# Patient Record
Sex: FEMALE | Race: WHITE | NOT HISPANIC OR LATINO | URBAN - METROPOLITAN AREA
[De-identification: names, ages, dates, MRNs, and addresses within clinical notes are randomized per-mention and may not be internally consistent; named-entity substitution may affect disease eponyms.]

---

## 2020-05-26 ENCOUNTER — INPATIENT (INPATIENT)
Facility: HOSPITAL | Age: 50
LOS: 2 days | Discharge: HOME | DRG: 247 | End: 2020-05-29
Attending: HOSPITALIST | Admitting: HOSPITALIST
Payer: COMMERCIAL

## 2020-05-26 VITALS
WEIGHT: 171.96 LBS | SYSTOLIC BLOOD PRESSURE: 171 MMHG | HEART RATE: 93 BPM | TEMPERATURE: 98 F | RESPIRATION RATE: 18 BRPM | DIASTOLIC BLOOD PRESSURE: 79 MMHG | HEIGHT: 65 IN | OXYGEN SATURATION: 98 %

## 2020-05-26 LAB
SARS-COV-2 RNA SPEC QL NAA+PROBE: SIGNIFICANT CHANGE UP
TROPONIN T SERPL-MCNC: 0.07 NG/ML — CRITICAL HIGH (ref 0–0.01)

## 2020-05-26 PROCEDURE — 93010 ELECTROCARDIOGRAM REPORT: CPT

## 2020-05-26 PROCEDURE — 99285 EMERGENCY DEPT VISIT HI MDM: CPT

## 2020-05-26 PROCEDURE — 71045 X-RAY EXAM CHEST 1 VIEW: CPT | Mod: 26

## 2020-05-26 RX ORDER — HEPARIN SODIUM 5000 [USP'U]/ML
INJECTION INTRAVENOUS; SUBCUTANEOUS
Qty: 25000 | Refills: 0 | Status: DISCONTINUED | OUTPATIENT
Start: 2020-05-26 | End: 2020-05-27

## 2020-05-26 RX ORDER — ATORVASTATIN CALCIUM 80 MG/1
80 TABLET, FILM COATED ORAL ONCE
Refills: 0 | Status: COMPLETED | OUTPATIENT
Start: 2020-05-27 | End: 2020-05-27

## 2020-05-26 RX ORDER — ASPIRIN/CALCIUM CARB/MAGNESIUM 324 MG
325 TABLET ORAL ONCE
Refills: 0 | Status: COMPLETED | OUTPATIENT
Start: 2020-05-26 | End: 2020-05-26

## 2020-05-26 RX ORDER — CLOPIDOGREL BISULFATE 75 MG/1
600 TABLET, FILM COATED ORAL ONCE
Refills: 0 | Status: COMPLETED | OUTPATIENT
Start: 2020-05-27 | End: 2020-05-27

## 2020-05-26 RX ORDER — HEPARIN SODIUM 5000 [USP'U]/ML
4700 INJECTION INTRAVENOUS; SUBCUTANEOUS EVERY 6 HOURS
Refills: 0 | Status: DISCONTINUED | OUTPATIENT
Start: 2020-05-26 | End: 2020-05-27

## 2020-05-26 RX ORDER — SODIUM CHLORIDE 9 MG/ML
500 INJECTION INTRAMUSCULAR; INTRAVENOUS; SUBCUTANEOUS
Refills: 0 | Status: DISCONTINUED | OUTPATIENT
Start: 2020-05-27 | End: 2020-05-27

## 2020-05-26 RX ADMIN — HEPARIN SODIUM 950 UNIT(S)/HR: 5000 INJECTION INTRAVENOUS; SUBCUTANEOUS at 20:18

## 2020-05-26 RX ADMIN — Medication 325 MILLIGRAM(S): at 20:18

## 2020-05-26 NOTE — ED ADULT NURSE NOTE - CHPI ED NUR SYMPTOMS NEG
no back pain/no congestion/no chills/no syncope/no nausea/no vomiting/no dizziness/no diaphoresis/no fever

## 2020-05-26 NOTE — ED PROVIDER NOTE - ATTENDING CONTRIBUTION TO CARE
49F essential thrombocytosis, +fhx triple bypass x2 (mother at age 47yo), c/o nonpositional nonpleuritic dull ss chest pain radiating to L jaw/BUE that woke pt from sleep ~3a today. +similar exertional cp/DAVIES improved w/ rest x1-2mo. no prior cardiac evaluation. no fever/chills, no uri/cough, no abd pain/n/v, no diarrhea, no hematochezia/melena, no dysuria, no pedal edema/pain/rash, no phx dvt/pe, no etoh/ivdu, no antiplatelet/AC. avss. nontoxic. NAD. no active cp. no acute resp distress. found to have elevated trop 0.07. ckmb wnl. ekg w/o acute abnl. no e/o sepsis vs sig anemia vs electrolyte abnl. ua neg. hcg neg. cxr w/o acute focal consol vs ptx vs pulm edema vs widened mediastinum. covid neg. cardiology consulted. s/p heparin gtt only per cards. will admit for nstemi.    I saw and discussed the care of the pt directly with the ACP while the pt was in the ED. i have reviewed the ACP note and agree w/ the history, exam and plan of care other than as noted above.

## 2020-05-26 NOTE — ED PROVIDER NOTE - PHYSICAL EXAMINATION
CONSTITUTIONAL: WD,WN. NAD.    SKIN: Normal color and turgor. No rash.    HEAD: NC/AT.  EYES: Conjunctiva clear. EOMI. PERRL.    ENT: Airway patent, OP without erythema, tonsillar swelling or exudate; uvula midline without swelling. Nasal mucosa clear, no rhinorrhea.   RESPIRATORY:  Breathing non-labored. No retractions or accessory muscle use.  Lungs CTA bilat.  CARDIOVASCULAR:  RRR, S1S2. No M/R/G.      GI:  Abdomen soft, nontender.    MSK: Neck supple with painless ROM.  No lower extremity edema or calf tenderness.  No joint swelling or ROM limitation.  NEURO: Alert and oriented; CN II-XII grossly intact. Speech clear. 5/5 strength in all extremities.  Good balance. Steady gait.

## 2020-05-26 NOTE — ED ADULT TRIAGE NOTE - CHIEF COMPLAINT QUOTE
50 y/o female came in to ED from urgent care for evaluation of chest pain since yesterday, pt reports pain radiating to left jaw, denies medical hx.

## 2020-05-26 NOTE — ED PROVIDER NOTE - CLINICAL SUMMARY MEDICAL DECISION MAKING FREE TEXT BOX
48 yo fem with chest pain concerning for angina.  Hx of essential thrombocytosis and borderline HLD.  Strong family hx with mother requiring CABG at age 46.  No COVID-19 symptoms or known exposure.  No SOB and PERC negative.  Troponin elevated.  Plan: aspirin, admit to cardiology

## 2020-05-26 NOTE — ED PROVIDER NOTE - PROGRESS NOTE DETAILS
d/w cardiology fellow, recommends heparin drip (no bolus), admit to cardiology. d/w microbiology lab: COVID-19 prelim neg, but being sent to core lab for confirmation and may not result for 24 hours.  will send another sample for expedited testing.

## 2020-05-26 NOTE — ED PROVIDER NOTE - OBJECTIVE STATEMENT
48 y/o F pt with PMHx of essential thrombocytosis and borderline HLD, and no significant PSHx presents to ED c/o chest pain. Pain initially woke her up 3AM the night before last, and since then she's had chest pain whenever she exerts herself, i.e. walking 1 block. Pain is dull, radiates up to jaw and down to elbow in bilateral arms, resolves within a few moments of resting, but pt has consistent dull pressure in her chest for a while after resting. Took Mylanta at onset of pain without relief. Of note, noticed mild exertional chest discomfort when walking up a hill x 2 weeks. Significant FHx in mother who had 2 triple bypasses, first bypass at age 46.     Not a smoker, no cocaine or other drug use, no PMHx of HTN or cardiac diseases.  Not currently on any meds and no allergies.

## 2020-05-26 NOTE — ED ADULT NURSE NOTE - OBJECTIVE STATEMENT
Pt is a 49y female, presented to ED w/ c/o CP x yesterday. Pt states worse on exertion and or lying down. PT states SOB when walking. Pt went to  and was sent for further evaluation. Denies any fever/chills/n/v/d.

## 2020-05-27 DIAGNOSIS — I24.9 ACUTE ISCHEMIC HEART DISEASE, UNSPECIFIED: ICD-10-CM

## 2020-05-27 DIAGNOSIS — E78.5 HYPERLIPIDEMIA, UNSPECIFIED: ICD-10-CM

## 2020-05-27 DIAGNOSIS — D47.3 ESSENTIAL (HEMORRHAGIC) THROMBOCYTHEMIA: ICD-10-CM

## 2020-05-27 DIAGNOSIS — I82.90 ACUTE EMBOLISM AND THROMBOSIS OF UNSPECIFIED VEIN: ICD-10-CM

## 2020-05-27 LAB
A1C WITH ESTIMATED AVERAGE GLUCOSE RESULT: 5.5 % — SIGNIFICANT CHANGE UP (ref 4–5.6)
ALBUMIN SERPL ELPH-MCNC: 3.9 G/DL — SIGNIFICANT CHANGE UP (ref 3.3–5)
ALP SERPL-CCNC: 48 U/L — SIGNIFICANT CHANGE UP (ref 40–120)
ALT FLD-CCNC: 10 U/L — SIGNIFICANT CHANGE UP (ref 10–45)
ANION GAP SERPL CALC-SCNC: 12 MMOL/L — SIGNIFICANT CHANGE UP (ref 5–17)
APPEARANCE UR: CLEAR — SIGNIFICANT CHANGE UP
APTT BLD: 49.9 SEC — HIGH (ref 27.5–36.3)
APTT BLD: 61.3 SEC — HIGH (ref 27.5–36.3)
APTT BLD: 66.5 SEC — HIGH (ref 27.5–36.3)
AST SERPL-CCNC: 17 U/L — SIGNIFICANT CHANGE UP (ref 10–40)
BASOPHILS # BLD AUTO: 0.05 K/UL — SIGNIFICANT CHANGE UP (ref 0–0.2)
BASOPHILS NFR BLD AUTO: 0.5 % — SIGNIFICANT CHANGE UP (ref 0–2)
BILIRUB SERPL-MCNC: 0.3 MG/DL — SIGNIFICANT CHANGE UP (ref 0.2–1.2)
BILIRUB UR-MCNC: NEGATIVE — SIGNIFICANT CHANGE UP
BUN SERPL-MCNC: 12 MG/DL — SIGNIFICANT CHANGE UP (ref 7–23)
CALCIUM SERPL-MCNC: 8.9 MG/DL — SIGNIFICANT CHANGE UP (ref 8.4–10.5)
CHLORIDE SERPL-SCNC: 107 MMOL/L — SIGNIFICANT CHANGE UP (ref 96–108)
CHOLEST SERPL-MCNC: 235 MG/DL — HIGH (ref 10–199)
CK MB CFR SERPL CALC: 3.2 NG/ML — SIGNIFICANT CHANGE UP (ref 0–6.7)
CK MB CFR SERPL CALC: 3.6 NG/ML — SIGNIFICANT CHANGE UP (ref 0–6.7)
CK SERPL-CCNC: 120 U/L — SIGNIFICANT CHANGE UP (ref 25–170)
CK SERPL-CCNC: 124 U/L — SIGNIFICANT CHANGE UP (ref 25–170)
CO2 SERPL-SCNC: 20 MMOL/L — LOW (ref 22–31)
COLOR SPEC: YELLOW — SIGNIFICANT CHANGE UP
CREAT SERPL-MCNC: 0.88 MG/DL — SIGNIFICANT CHANGE UP (ref 0.5–1.3)
DIFF PNL FLD: NEGATIVE — SIGNIFICANT CHANGE UP
EOSINOPHIL # BLD AUTO: 0.17 K/UL — SIGNIFICANT CHANGE UP (ref 0–0.5)
EOSINOPHIL NFR BLD AUTO: 1.9 % — SIGNIFICANT CHANGE UP (ref 0–6)
ESTIMATED AVERAGE GLUCOSE: 111 MG/DL — SIGNIFICANT CHANGE UP (ref 68–114)
GLUCOSE SERPL-MCNC: 87 MG/DL — SIGNIFICANT CHANGE UP (ref 70–99)
GLUCOSE UR QL: NEGATIVE — SIGNIFICANT CHANGE UP
HCG SERPL-ACNC: <0 MIU/ML — SIGNIFICANT CHANGE UP
HCT VFR BLD CALC: 36.9 % — SIGNIFICANT CHANGE UP (ref 34.5–45)
HCT VFR BLD CALC: 37.3 % — SIGNIFICANT CHANGE UP (ref 34.5–45)
HDLC SERPL-MCNC: 56 MG/DL — SIGNIFICANT CHANGE UP
HGB BLD-MCNC: 12.2 G/DL — SIGNIFICANT CHANGE UP (ref 11.5–15.5)
HGB BLD-MCNC: 12.3 G/DL — SIGNIFICANT CHANGE UP (ref 11.5–15.5)
IMM GRANULOCYTES NFR BLD AUTO: 0.2 % — SIGNIFICANT CHANGE UP (ref 0–1.5)
INR BLD: 1.08 — SIGNIFICANT CHANGE UP (ref 0.88–1.16)
KETONES UR-MCNC: NEGATIVE — SIGNIFICANT CHANGE UP
LEUKOCYTE ESTERASE UR-ACNC: NEGATIVE — SIGNIFICANT CHANGE UP
LIPID PNL WITH DIRECT LDL SERPL: 141 MG/DL — HIGH
LYMPHOCYTES # BLD AUTO: 3.39 K/UL — HIGH (ref 1–3.3)
LYMPHOCYTES # BLD AUTO: 37.2 % — SIGNIFICANT CHANGE UP (ref 13–44)
MAGNESIUM SERPL-MCNC: 2.3 MG/DL — SIGNIFICANT CHANGE UP (ref 1.6–2.6)
MCHC RBC-ENTMCNC: 30.1 PG — SIGNIFICANT CHANGE UP (ref 27–34)
MCHC RBC-ENTMCNC: 30.4 PG — SIGNIFICANT CHANGE UP (ref 27–34)
MCHC RBC-ENTMCNC: 33 GM/DL — SIGNIFICANT CHANGE UP (ref 32–36)
MCHC RBC-ENTMCNC: 33.1 GM/DL — SIGNIFICANT CHANGE UP (ref 32–36)
MCV RBC AUTO: 91.1 FL — SIGNIFICANT CHANGE UP (ref 80–100)
MCV RBC AUTO: 92.1 FL — SIGNIFICANT CHANGE UP (ref 80–100)
MONOCYTES # BLD AUTO: 0.6 K/UL — SIGNIFICANT CHANGE UP (ref 0–0.9)
MONOCYTES NFR BLD AUTO: 6.6 % — SIGNIFICANT CHANGE UP (ref 2–14)
NEUTROPHILS # BLD AUTO: 4.88 K/UL — SIGNIFICANT CHANGE UP (ref 1.8–7.4)
NEUTROPHILS NFR BLD AUTO: 53.6 % — SIGNIFICANT CHANGE UP (ref 43–77)
NITRITE UR-MCNC: NEGATIVE — SIGNIFICANT CHANGE UP
NRBC # BLD: 0 /100 WBCS — SIGNIFICANT CHANGE UP (ref 0–0)
NRBC # BLD: 0 /100 WBCS — SIGNIFICANT CHANGE UP (ref 0–0)
PH UR: 6 — SIGNIFICANT CHANGE UP (ref 5–8)
PLATELET # BLD AUTO: 404 K/UL — HIGH (ref 150–400)
PLATELET # BLD AUTO: 418 K/UL — HIGH (ref 150–400)
POTASSIUM SERPL-MCNC: 4.5 MMOL/L — SIGNIFICANT CHANGE UP (ref 3.5–5.3)
POTASSIUM SERPL-SCNC: 4.5 MMOL/L — SIGNIFICANT CHANGE UP (ref 3.5–5.3)
PROT SERPL-MCNC: 7 G/DL — SIGNIFICANT CHANGE UP (ref 6–8.3)
PROT UR-MCNC: NEGATIVE MG/DL — SIGNIFICANT CHANGE UP
PROTHROM AB SERPL-ACNC: 12.3 SEC — SIGNIFICANT CHANGE UP (ref 10–12.9)
RBC # BLD: 4.05 M/UL — SIGNIFICANT CHANGE UP (ref 3.8–5.2)
RBC # BLD: 4.05 M/UL — SIGNIFICANT CHANGE UP (ref 3.8–5.2)
RBC # FLD: 13.3 % — SIGNIFICANT CHANGE UP (ref 10.3–14.5)
RBC # FLD: 13.4 % — SIGNIFICANT CHANGE UP (ref 10.3–14.5)
SODIUM SERPL-SCNC: 139 MMOL/L — SIGNIFICANT CHANGE UP (ref 135–145)
SP GR SPEC: 1.01 — SIGNIFICANT CHANGE UP (ref 1–1.03)
TOTAL CHOLESTEROL/HDL RATIO MEASUREMENT: 4.2 RATIO — SIGNIFICANT CHANGE UP (ref 3.3–7.1)
TRIGL SERPL-MCNC: 188 MG/DL — HIGH (ref 10–149)
TROPONIN T SERPL-MCNC: 0.07 NG/ML — CRITICAL HIGH (ref 0–0.01)
TROPONIN T SERPL-MCNC: 0.08 NG/ML — CRITICAL HIGH (ref 0–0.01)
UROBILINOGEN FLD QL: 0.2 E.U./DL — SIGNIFICANT CHANGE UP
WBC # BLD: 10.44 K/UL — SIGNIFICANT CHANGE UP (ref 3.8–10.5)
WBC # BLD: 9.11 K/UL — SIGNIFICANT CHANGE UP (ref 3.8–10.5)
WBC # FLD AUTO: 10.44 K/UL — SIGNIFICANT CHANGE UP (ref 3.8–10.5)
WBC # FLD AUTO: 9.11 K/UL — SIGNIFICANT CHANGE UP (ref 3.8–10.5)

## 2020-05-27 PROCEDURE — 93458 L HRT ARTERY/VENTRICLE ANGIO: CPT | Mod: 26

## 2020-05-27 PROCEDURE — 99233 SBSQ HOSP IP/OBS HIGH 50: CPT

## 2020-05-27 PROCEDURE — 93306 TTE W/DOPPLER COMPLETE: CPT | Mod: 26

## 2020-05-27 PROCEDURE — 99223 1ST HOSP IP/OBS HIGH 75: CPT

## 2020-05-27 RX ORDER — METOPROLOL TARTRATE 50 MG
12.5 TABLET ORAL
Refills: 0 | Status: DISCONTINUED | OUTPATIENT
Start: 2020-05-27 | End: 2020-05-28

## 2020-05-27 RX ORDER — POTASSIUM CHLORIDE 20 MEQ
40 PACKET (EA) ORAL ONCE
Refills: 0 | Status: COMPLETED | OUTPATIENT
Start: 2020-05-27 | End: 2020-05-27

## 2020-05-27 RX ORDER — ACETAMINOPHEN 500 MG
650 TABLET ORAL ONCE
Refills: 0 | Status: DISCONTINUED | OUTPATIENT
Start: 2020-05-27 | End: 2020-05-28

## 2020-05-27 RX ORDER — HEPARIN SODIUM 5000 [USP'U]/ML
1100 INJECTION INTRAVENOUS; SUBCUTANEOUS
Qty: 25000 | Refills: 0 | Status: DISCONTINUED | OUTPATIENT
Start: 2020-05-27 | End: 2020-05-28

## 2020-05-27 RX ORDER — ATORVASTATIN CALCIUM 80 MG/1
80 TABLET, FILM COATED ORAL AT BEDTIME
Refills: 0 | Status: DISCONTINUED | OUTPATIENT
Start: 2020-05-27 | End: 2020-05-29

## 2020-05-27 RX ORDER — ASPIRIN/CALCIUM CARB/MAGNESIUM 324 MG
81 TABLET ORAL ONCE
Refills: 0 | Status: COMPLETED | OUTPATIENT
Start: 2020-05-27 | End: 2020-05-27

## 2020-05-27 RX ORDER — TICAGRELOR 90 MG/1
180 TABLET ORAL ONCE
Refills: 0 | Status: COMPLETED | OUTPATIENT
Start: 2020-05-27 | End: 2020-05-27

## 2020-05-27 RX ORDER — ASPIRIN/CALCIUM CARB/MAGNESIUM 324 MG
81 TABLET ORAL DAILY
Refills: 0 | Status: DISCONTINUED | OUTPATIENT
Start: 2020-05-27 | End: 2020-05-29

## 2020-05-27 RX ORDER — ALPRAZOLAM 0.25 MG
0.25 TABLET ORAL ONCE
Refills: 0 | Status: DISCONTINUED | OUTPATIENT
Start: 2020-05-27 | End: 2020-05-27

## 2020-05-27 RX ORDER — TICAGRELOR 90 MG/1
90 TABLET ORAL EVERY 12 HOURS
Refills: 0 | Status: DISCONTINUED | OUTPATIENT
Start: 2020-05-28 | End: 2020-05-29

## 2020-05-27 RX ORDER — HEPARIN SODIUM 5000 [USP'U]/ML
1100 INJECTION INTRAVENOUS; SUBCUTANEOUS
Qty: 25000 | Refills: 0 | Status: DISCONTINUED | OUTPATIENT
Start: 2020-05-27 | End: 2020-05-27

## 2020-05-27 RX ORDER — HEPARIN SODIUM 5000 [USP'U]/ML
6500 INJECTION INTRAVENOUS; SUBCUTANEOUS EVERY 6 HOURS
Refills: 0 | Status: DISCONTINUED | OUTPATIENT
Start: 2020-05-27 | End: 2020-05-28

## 2020-05-27 RX ORDER — HEPARIN SODIUM 5000 [USP'U]/ML
3000 INJECTION INTRAVENOUS; SUBCUTANEOUS EVERY 6 HOURS
Refills: 0 | Status: DISCONTINUED | OUTPATIENT
Start: 2020-05-27 | End: 2020-05-28

## 2020-05-27 RX ADMIN — Medication 0.25 MILLIGRAM(S): at 02:45

## 2020-05-27 RX ADMIN — Medication 40 MILLIEQUIVALENT(S): at 01:45

## 2020-05-27 RX ADMIN — HEPARIN SODIUM 1100 UNIT(S)/HR: 5000 INJECTION INTRAVENOUS; SUBCUTANEOUS at 02:57

## 2020-05-27 RX ADMIN — CLOPIDOGREL BISULFATE 600 MILLIGRAM(S): 75 TABLET, FILM COATED ORAL at 06:04

## 2020-05-27 RX ADMIN — Medication 12.5 MILLIGRAM(S): at 19:01

## 2020-05-27 RX ADMIN — Medication 12.5 MILLIGRAM(S): at 06:04

## 2020-05-27 RX ADMIN — ATORVASTATIN CALCIUM 80 MILLIGRAM(S): 80 TABLET, FILM COATED ORAL at 21:08

## 2020-05-27 RX ADMIN — Medication 81 MILLIGRAM(S): at 06:04

## 2020-05-27 RX ADMIN — HEPARIN SODIUM 11 UNIT(S)/HR: 5000 INJECTION INTRAVENOUS; SUBCUTANEOUS at 06:37

## 2020-05-27 RX ADMIN — ATORVASTATIN CALCIUM 80 MILLIGRAM(S): 80 TABLET, FILM COATED ORAL at 06:04

## 2020-05-27 RX ADMIN — HEPARIN SODIUM 1100 UNIT(S)/HR: 5000 INJECTION INTRAVENOUS; SUBCUTANEOUS at 21:07

## 2020-05-27 RX ADMIN — SODIUM CHLORIDE 75 MILLILITER(S): 9 INJECTION INTRAMUSCULAR; INTRAVENOUS; SUBCUTANEOUS at 07:01

## 2020-05-27 RX ADMIN — TICAGRELOR 180 MILLIGRAM(S): 90 TABLET ORAL at 21:08

## 2020-05-27 NOTE — PROGRESS NOTE ADULT - SUBJECTIVE AND OBJECTIVE BOX
S: Pt seen and examined bedside.  Patient denies C/P, SOB, N/V, dizziness, palpitations, and diaphoresis.  Pt denies fever/chills, dysuria, abdominal pain, diarrhea, and cough  12 Point ROS otherwise negative except as per HPI/subjective.     O: Vital Signs Last 24 Hrs  T(C): 36.6 (27 May 2020 09:21), Max: 36.9 (26 May 2020 18:35)  T(F): 97.9 (27 May 2020 09:21), Max: 98.5 (26 May 2020 18:35)  HR: 59 (27 May 2020 10:45) (59 - 93)  BP: 130/64 (27 May 2020 10:45) (122/69 - 171/79)  BP(mean): 91 (27 May 2020 10:45) (90 - 100)  RR: 16 (27 May 2020 10:45) (14 - 18)  SpO2: 98% (27 May 2020 10:45) (93% - 98%)    PHYSICAL EXAM:  GEN: NAD  HEENT: No JVD  PULM:  CTA B/L  CARD:  RRR, S1 and S2   ABD: +BS, NT, soft/ND	  EXT: No Edema B/L LE  NEURO: A+Ox3, no focal deficit  PSYCH: Mood Appropriate    LABS:                        12.3   9.11  )-----------( 418      ( 27 May 2020 06:10 )             37.3     05-27    139  |  107  |  12  ----------------------------<  87  4.5   |  20<L>  |  0.88    Ca    8.9      27 May 2020 06:10  Mg     2.3     05-27    TPro  7.0  /  Alb  3.9  /  TBili  0.3  /  DBili  <0.2  /  AST  17  /  ALT  10  /  AlkPhos  48  05-27    PT/INR - ( 27 May 2020 06:10 )   PT: 12.3 sec;   INR: 1.08          PTT - ( 27 May 2020 08:58 )  PTT:66.5 sec  Troponin T, Serum: 0.08 ng/mL (05-27-20 @ 06:10)  Troponin T, Serum: 0.07 ng/mL (05-27-20 @ 00:54)  Troponin T, Serum: 0.07 ng/mL (05-26-20 @ 22:05)  Troponin T, Serum: 0.07 ng/mL (05-26-20 @ 19:17)      Daily Height in cm: 165.1 (27 May 2020 06:38)    Daily S: Pt seen and examined bedside. Pt denies any symptoms CP at this time, without other complaints.   Patient denies C/P, SOB, N/V, dizziness, palpitations, and diaphoresis.  Pt denies fever/chills, dysuria, abdominal pain, diarrhea, and cough  12 Point ROS otherwise negative except as per HPI/subjective.     O: Vital Signs Last 24 Hrs  T(C): 36.6 (27 May 2020 09:21), Max: 36.9 (26 May 2020 18:35)  T(F): 97.9 (27 May 2020 09:21), Max: 98.5 (26 May 2020 18:35)  HR: 59 (27 May 2020 10:45) (59 - 93)  BP: 130/64 (27 May 2020 10:45) (122/69 - 171/79)  BP(mean): 91 (27 May 2020 10:45) (90 - 100)  RR: 16 (27 May 2020 10:45) (14 - 18)  SpO2: 98% (27 May 2020 10:45) (93% - 98%)    PHYSICAL EXAM:  GEN: NAD  HEENT: No JVD, Mallampati II  PULM:  CTA B/L, no WRR  CARD:  RRR, S1 and S2 , no MRG  ABD: +BS, NT, soft/ND	  EXT: No Edema B/L LE, Radial pulses 2+ B/L, DP/PT 1+ B/L, Femoral pulses 1+ B/L, no bruits   NEURO: A+Ox3, no focal deficits  PSYCH: Mood Appropriate    LABS:                        12.3   9.11  )-----------( 418      ( 27 May 2020 06:10 )             37.3     05-27    139  |  107  |  12  ----------------------------<  87  4.5   |  20<L>  |  0.88    Ca    8.9      27 May 2020 06:10  Mg     2.3     05-27    TPro  7.0  /  Alb  3.9  /  TBili  0.3  /  DBili  <0.2  /  AST  17  /  ALT  10  /  AlkPhos  48  05-27    PT/INR - ( 27 May 2020 06:10 )   PT: 12.3 sec;   INR: 1.08          PTT - ( 27 May 2020 08:58 )  PTT:66.5 sec  Troponin T, Serum: 0.08 ng/mL (05-27-20 @ 06:10)  Troponin T, Serum: 0.07 ng/mL (05-27-20 @ 00:54)  Troponin T, Serum: 0.07 ng/mL (05-26-20 @ 22:05)  Troponin T, Serum: 0.07 ng/mL (05-26-20 @ 19:17)      Daily Height in cm: 165.1 (27 May 2020 06:38)

## 2020-05-27 NOTE — PROGRESS NOTE ADULT - PROBLEM SELECTOR PLAN 3
H/H 11.9/36.0 and Platelets 380 on admission H/H 11.9/36.0 and Platelets 380 on admission, 418 on 5/27/20   - Continue to monitor      F: NS @ 75 cc/hr pre cath   E: Replete PRN  N: NPO for cath, DASH/TLC  GI PPX:  DVT PPX: Heparin gtt  Code:  Full  Dispo: Pending cardiac catheterization    Case discussed with Dr. Fischer H/H 11.9/36.0 and Platelets 380 on admission, 418 on 5/27/20   - Continue to monitor      F: NS @ 75 cc/hr pre cath   E: Replete PRN  N: NPO for cath, DASH/TLC  GI PPX:  DVT PPX: Heparin gtt  Code:  Full  Dispo: Pending CTS evaluation     Case discussed with Dr. Fischer H/H 11.9/36.0 and Platelets 380 on admission, 418 on 5/27/20   - Continue to monitor      F: NS @ 75 cc/hr pre cath   E: Replete PRN  N: NPO for cath, DASH/TLC  GI PPX:  DVT PPX: Heparin gtt  Code:  Full  Dispo: Pending staged PCI of LAD on 5/28/20  Case discussed with Dr. Fischer

## 2020-05-27 NOTE — H&P ADULT - HISTORY OF PRESENT ILLNESS
50 y/o female with significant family hx of CAD (Mother X2 3VCABG at age 46) and PMHx of HLD (borderline; diet controlled) and Essential Thrombocytosis presents to [facility] ER this evening, 5/26/20, from Urgent Care, complaining of intermittent, exertional, chest pressure radiating between scapula, up right jaw and down b/l arms X2 days.      According to patient, symptoms of chest pressure, described as dull pressure/burning sensation, rated 6/10, lasting 3 minutes, began on Monday morning around 3AM.  Pt. states chest pressure woke her from sleep and took Mylanta assuming she was experiencing acid reflux which gave some relief.   She reports later in the day she went for a walk, one block, and chest pressure returned, this time more  intense  and fequent, radiating between scapula, right jaw and b/l arms.    Pt. admits 2 weeks ago she experienced,  non radiating, mild chest pressure when walking up a hill which subsided quickly with rest.  She states she is normally very active and never experienced chest pressure with ambulation.   Pt. denies fever, sick contact, HA, SOB, dizziness, diaphoresis, abdominal discomfort and n/v.      In ER ECG reveals NSR@65bpm with non specific ST-T wave changes, Troponin 0.07-->0.07, CXR reveals no acute pathology, FULL CODE; A&O X3  48 y/o female with significant family hx of CAD (Mother X2 3VCABG at age 46) and PMHx of HLD (borderline; diet controlled) and Essential Thrombocytosis presents to [facility] ER this evening, 5/26/20, from Urgent Care, complaining of intermittent, exertional, chest pressure radiating between scapula, up right jaw and down b/l arms X2 days.      According to patient, symptoms of chest pressure, described as dull pressure/burning sensation, rated 6/10, lasting 3 minutes, began on Monday morning around 3AM.  Pt. states chest pressure woke her from sleep and took Mylanta assuming she was experiencing acid reflux which gave some relief.   She reports later in the day she went for a walk, one block, and chest pressure returned, this time more  intense  and fequent, radiating between scapula, right jaw and b/l arms.    Pt. admits 2 weeks ago she experienced,  non radiating, mild chest pressure when walking up a hill which subsided quickly with rest.  She states she is normally very active and never experienced chest pressure with ambulation.   Pt. denies fever, sick contact, HA, SOB, dizziness, diaphoresis, abdominal discomfort and n/v.      In ER ECG reveals NSR@65bpm with non specific ST-T wave changes, Troponin 0.07-->0.07, H/H 11.9/36.0, Platelets 380, and  CXR reveals no acute pathology.  Pt. was given a Asprin Ec load of 325mg PO X1 and started on Hep gtt.  In light of patient's family hx, PMHx and symptoms pt. is admitted to [facility] for recommended Left Heart Catheterization with possible intervention tomorrow.  Continue telemetry, ECG, serial CE, Echocardiogram and NPO for Left Heart Catheterization.  Pt. is NPO, precath and consented.

## 2020-05-27 NOTE — H&P ADULT - NSHPLABSRESULTS_GEN_ALL_CORE
11.9   10.30 )-----------( 380      ( 26 May 2020 19:17 )             36.0       05-26    139  |  106  |  16  ----------------------------<  100<H>  3.8   |  21<L>  |  0.87    Ca    9.5      26 May 2020 19:17  Mg     2.2     05-26    TPro  7.0  /  Alb  4.1  /  TBili  <0.2  /  DBili  x   /  AST  20  /  ALT  10  /  AlkPhos  48  05-26      PT/INR - ( 26 May 2020 19:17 )   PT: 12.6 sec;   INR: 1.10          PTT - ( 26 May 2020 19:17 )  PTT:29.0 sec    CARDIAC MARKERS ( 26 May 2020 22:05 )  x     / 0.07 ng/mL / x     / x     / x      CARDIAC MARKERS ( 26 May 2020 19:17 )  x     / 0.07 ng/mL / 135 U/L / x     / 4.2 ng/mL            EKG: NSR@65bpm with non specific ST-T wave changes

## 2020-05-27 NOTE — CHART NOTE - NSCHARTNOTEFT_GEN_A_CORE
Admitting Diagnosis:   Patient is a 49y old  Female who presents with a chief complaint of Chest pain (27 May 2020 12:54)      Consult: Yes [   ]  No [  x  ]    Reason for Initial Nutrition Assessment:  LOS       PAST MEDICAL & SURGICAL HISTORY:  Borderline hyperlipidemia  Essential thrombocytosis      Current Nutrition Order:  DASH TLC  NPO    PO Intake: Good (%) [   ]  Fair (50-75%) [   ] Poor (<25%) [   ]  Please see Below     GI Issues:   WDL per flow sheets, pt confirms     Pain:  no pain     Skin Integrity:  No edema/pressure ulcers     Labs:   05-27    139  |  107  |  12  ----------------------------<  87  4.5   |  20<L>  |  0.88    Ca    8.9      27 May 2020 06:10  Mg     2.3     05-27    TPro  7.0  /  Alb  3.9  /  TBili  0.3  /  DBili  <0.2  /  AST  17  /  ALT  10  /  AlkPhos  48  05-27    CAPILLARY BLOOD GLUCOSE        Nutritionally Pertinent Lab Values:    Medications:  MEDICATIONS  (STANDING):  aspirin enteric coated 81 milliGRAM(s) Oral daily  metoprolol tartrate 12.5 milliGRAM(s) Oral two times a day    MEDICATIONS  (PRN):      Admitted Anthropometrics:  5'5''  pounds  wt 170 pounds  %BUN=694%  BMI 28.4     Weight Change: Pt denies wt changes PTA, reports  pounds - suggests overall wt stable within 2 pound +     Nutrition Focused Physical Exam: Completed [   ]  Unable to complete [ x  ]    Estimated energy needs:   IBW used to calculate energy needs due to pt's current body weight exceeding 120% of IBW   Adjusted for wt based on current conditions, fluids per team     Subjective:   48yo F, PMHx of HLD (borderline; diet controlled) and Essential Thrombocytosis presents to [facility] ER this evening, 5/26/20, from Urgent Care, complaining of intermittent, exertional, chest pressure radiating between scapula, up right jaw and down b/l arms X2 days. Pt admitted to [facility] for recommended Left Heart Catheterization with possible intervention -  Preliminary COVID test negative, awaiting final results (pt remains on r/o COVID19 isolation at this time on 5LA). ECHO 5/27 revealed normal LV size and function, normal RV size and systolic function, no significant valvular disease, no pericardial effusion, EF 63%. Pt consented for cardiac catheterization 5/27; pt is now s/p cath today with noted consult for CABG.  Pt ordered for NPO / DASH diet at this time. Pt denies getting PO meal today- reports she is hungry. Reports PTA doing own cooking and using fresh food, good PO intake, No issues chewing/swallowing, NKFA.     Nutrition Diagnosis: Inadequate energy intake RT intake<EER AEB pt currently meeting 0% of EER at this time   Goal: Diet to be advanced in 24-48hrs.     Recommendations:    Education:   Risk Level: High [   ] Moderate [   ] Low [   ] Admitting Diagnosis:   Patient is a 49y old  Female who presents with a chief complaint of Chest pain (27 May 2020 12:54)      Consult: Yes [   ]  No [  x  ]    Reason for Initial Nutrition Assessment:  LOS       PAST MEDICAL & SURGICAL HISTORY:  Borderline hyperlipidemia  Essential thrombocytosis      Current Nutrition Order:  DASH TLC  NPO    PO Intake: Good (%) [   ]  Fair (50-75%) [   ] Poor (<25%) [   ]  Please see Below     GI Issues:   WDL per flow sheets, pt confirms     Pain:  no pain     Skin Integrity:  No edema/pressure ulcers     Labs:   05-27    139  |  107  |  12  ----------------------------<  87  4.5   |  20<L>  |  0.88    Ca    8.9      27 May 2020 06:10  Mg     2.3     05-27    TPro  7.0  /  Alb  3.9  /  TBili  0.3  /  DBili  <0.2  /  AST  17  /  ALT  10  /  AlkPhos  48  05-27    CAPILLARY BLOOD GLUCOSE        Nutritionally Pertinent Lab Values:    Medications:  MEDICATIONS  (STANDING):  aspirin enteric coated 81 milliGRAM(s) Oral daily  metoprolol tartrate 12.5 milliGRAM(s) Oral two times a day    MEDICATIONS  (PRN):      Admitted Anthropometrics:  5'5''  pounds  wt 170 pounds  %QVR=030%  BMI 28.4     Weight Change: Pt denies wt changes PTA, reports  pounds - suggests overall wt stable within 2 pound +     Nutrition Focused Physical Exam: Completed [   ]  Unable to complete [ x  ]    Estimated energy needs:   IBW used to calculate energy needs due to pt's current body weight exceeding 120% of IBW   Adjusted for wt based on current conditions, fluids per team     Subjective:   48yo F, PMHx of HLD (borderline; diet controlled) and Essential Thrombocytosis presents to [facility] ER this evening, 5/26/20, from Urgent Care, complaining of intermittent, exertional, chest pressure radiating between scapula, up right jaw and down b/l arms X2 days. Pt admitted to [facility] for recommended Left Heart Catheterization with possible intervention -  Preliminary COVID test negative, awaiting final results (pt remains on r/o COVID19 isolation at this time on 5LA). ECHO 5/27 revealed normal LV size and function, normal RV size and systolic function, no significant valvular disease, no pericardial effusion, EF 63%. Pt consented for cardiac catheterization 5/27; pt is now s/p cath today with noted consult for CABG.  Face to Face interview deferred 2/2 COVID isolation precautions. Spoke with pt via Phone. Pt ordered for NPO / DASH diet at this time. Pt denies getting PO meal today- reports she is hungry. Reports PTA doing own cooking and using fresh food, good PO intake, No issues chewing/swallowing, NKFA.   Please see below for nutritions recommendations.     Nutrition Diagnosis: Inadequate energy intake RT intake<EER AEB pt currently meeting 0% of EER at this time   Goal: Diet to be advanced in 24-48hrs.     Recommendations:  1. Diet to be advanced in 24-48hrs.   2. DASH TLC diet.  3. Monitor %PO intake, diet tolerance.  4. Monitor Labs, Wts, Skin, GI, GOC.  5. RD to remain available for additional nutrition interventions as needed.     Education: DASH Diet education provided. Understanding stated, provide additional motivation as needed.   Risk Level: High [   ] Moderate [  x ] Low [   ]

## 2020-05-27 NOTE — H&P ADULT - NSICDXFAMILYHX_GEN_ALL_CORE_FT
FAMILY HISTORY:  Family history of early CAD  FH: CABG (coronary artery bypass surgery), Mother X2  3VCABG at the age 46

## 2020-05-27 NOTE — H&P ADULT - ASSESSMENT
50 y/o female with significant family hx of CAD (Mother X2 3VCABG at age 46) and PMHx of HLD (borderline; diet controlled) and Essential Thrombocytosis presents to [facility] ER this evening, 5/26/20, from Urgent Care, complaining of intermittent, exertional, chest pressure radiating between scapula, up right jaw and down b/l arms X2 days.

## 2020-05-27 NOTE — PROGRESS NOTE ADULT - REASON FOR ADMISSION
Angina Class lll symptoms.  Pt. complaining of intermittent, exertional midsternal chest pressure, radiating between scapula, right jaw and b/l arms X 2 days.  Pt. has a significant family hx of CAD (mother 3CABG X2) and with elevated Troponin (0.07) Chest pain

## 2020-05-27 NOTE — CONSULT NOTE ADULT - SUBJECTIVE AND OBJECTIVE BOX
Preventive Cardiology Consultation Note    CHIEF COMPLAINT: s/p cardiac catheterization requiring cardiovascular prevention optimization and education    HISTORY OF PRESENT ILLNESS:    Review of systems otherwise negative.     Lifestyle History:  Mediterranean Diet Score (9 question survey) was x.   (8-9: optimal, 6-7: near-optimal, 4-5: suboptimal, 0-3: markedly suboptimal)  Exercise: Patient reports exercising at a moderate level for ****** minutes per week. Patient denies any regular exercise other than walking necessary for daily activities.   Smoking: Former smoker (1 PPD x 20 years; quit 20 years ago). Patient denies any history of smoking.   Stress: Patient denies any stress.     PAST MEDICAL & SURGICAL HISTORY:  Borderline hyperlipidemia  Essential thrombocytosis    FAMILY HISTORY:   Patient denies any first degree family history of premature CAD or CVA.     Allergies:   No Known Allergies      HOME MEDICATIONS:    PHYSICAL EXAM:  T(C): 37.1 (05-27-20 @ 16:56), Max: 37.1 (05-27-20 @ 16:56)  T(F): 98.8 (05-27-20 @ 16:56), Max: 98.8 (05-27-20 @ 16:56)  HR: 70 (05-27-20 @ 15:30) (59 - 85)  BP: 150/67 (05-27-20 @ 15:30) (122/69 - 155/69)  RR: 16 (05-27-20 @ 10:45) (14 - 18)  SpO2: 96% (05-27-20 @ 15:30) (93% - 99%)  Wt(kg): --  Height (cm): 165.1 (05-27 @ 06:38)  Weight (kg): 77.4 (05-27 @ 06:38)  BMI (kg/m2): 28.4 (05-27 @ 06:38)  	  Gen- awake, conversive  Head-NCAT; eyes: no corneal arcus noted b/l; no xanthelasmas   Neck- no thyromegaly, no cervical LAD, no JVD, no carotid bruit b/l  Respiratory- good air entry b/l, no WRR  Cardiovascular- S1S2, RRR, no MRG appr, no LE edema b/l, distal pulses 2+ b/l  Gastrointestinal- no HSM, no masses  Neurology- moves all extremities, no focal deficits  Psych- normal affect, non-depressed mood  Skin- no lesions, no rashes, no xanthomas     LABS:	                                12.3   9.11  )-----------( 418      ( 27 May 2020 06:10 )             37.3     05-27    139  |  107  |  12  ----------------------------<  87  4.5   |  20<L>  |  0.88    Ca    8.9      27 May 2020 06:10  Mg     2.3     05-27    TPro  7.0  /  Alb  3.9  /  TBili  0.3  /  DBili  <0.2  /  AST  17  /  ALT  10  /  AlkPhos  48  05-27          Cholesterol, Serum: 235 mg/dL (05-27 @ 06:10)  HDL Cholesterol, Serum: 56 mg/dL (05-27 @ 06:10)  Triglycerides, Serum: 188 mg/dL (05-27 @ 06:10)  Direct LDL: 141 mg/dL (05-27 @ 06:10)        ASSESSMENT/RECOMMENDATIONS: 	  Patient's dietary, exercise and overall lifestyle habits were reviewed. The concept of atherosclerosis and its systemic nature was discussed with a focus on the need to get all cardiovascular risk factors to goal. At this time, I would like to make the following recommendations to optimize atherosclerotic risk factors.     RECOMMENDATIONS:   Anti-platelet Therapy: DAPT per interventionalist recommendation.   Lipid Therapy: High dose statin therapy would likely benefit this patient.   Hypertension: Blood pressures during this stay were well-controlled.   Mediterranean Diet Score is . Some suggestions include   Exercise: Recommended gradually increasing activity to 30-45 minutes most days of the week once cleared by referring cardiologist. Cardiac rehab might benefit this patient and is covered by major insurance plans (other than co-pays), please refer.   Medication Adherence: Patient has no issues with adherence at this time.   Smoking: This patient is a non-smoker.   Obesity/Overweight: The patient was advised about specific mechanisms such as reduced portions and increased activity for efforts toward weight loss.   Glucometabolic State: Patient's blood sugar is well-controlled at this time. HbA1c today was .   Sleep Apnea: The patient is at low risk for sleep apnea.   Psychological Stress: The patient appears to be coping with stressors well at this time.     Thank you for the opportunity to see this patient. Please feel free to contact Prevention if there are any questions, or if you feel that your patient would benefit from continued follow-up visits with the Program.    Swati Galeana, [facility]-BC  Cardiovascular Prevention     Donna Guallpa MD  System Director, Cardiovascular Prevention Preventive Cardiology Consultation Note    CHIEF COMPLAINT: s/p cardiac catheterization requiring cardiovascular prevention optimization and education    HISTORY OF PRESENT ILLNESS: 50 y/o female with significant family hx of CAD (Mother X2 3VCABG at age 46) and PMHx of HLD and Essential Thrombocytosis presented to [facility] ER the evening of 5/26/20, from Urgent Care, complaining of intermittent, exertional, chest pressure radiating between scapula, up right jaw and down b/l arms X2 days. Patient is now s/p cardiac catheterization 5/27/20 revealing 2V CAD with plan for staged PCI of LAD on 5/28/20 and staged PCI of RCA in 4-6 weeks.       Review of systems otherwise negative.     Lifestyle History:  Mediterranean Diet Score (9 question survey) was 7.   (8-9: optimal, 6-7: near-optimal, 4-5: suboptimal, 0-3: markedly suboptimal)  Exercise: Patient reports exercising at a moderate level for >150 minutes per week.  Smoking: Patient denies any history of smoking.   Stress: Patient denies any stress.     PAST MEDICAL & SURGICAL HISTORY:  Borderline hyperlipidemia  Essential thrombocytosis    FAMILY HISTORY:   CAD (Mother X2 3VCABG at age 46)     Allergies:   No Known Allergies      MEDICATIONS:   aspirin enteric coated 81 milliGRAM(s) Oral daily  atorvastatin 80 milliGRAM(s) Oral at bedtime  metoprolol succinate ER 25 milliGRAM(s) Oral daily  ticagrelor 90 milliGRAM(s) Oral every 12 hours      PHYSICAL EXAM:  T(C): 37.1 (05-27-20 @ 16:56), Max: 37.1 (05-27-20 @ 16:56)  T(F): 98.8 (05-27-20 @ 16:56), Max: 98.8 (05-27-20 @ 16:56)  HR: 70 (05-27-20 @ 15:30) (59 - 85)  BP: 150/67 (05-27-20 @ 15:30) (122/69 - 155/69)  RR: 16 (05-27-20 @ 10:45) (14 - 18)  SpO2: 96% (05-27-20 @ 15:30) (93% - 99%)  Height (cm): 165.1 (05-27 @ 06:38)  Weight (kg): 77.4 (05-27 @ 06:38)  BMI (kg/m2): 28.4 (05-27 @ 06:38)  	  Gen- awake, conversive  Head-NCAT; eyes: no corneal arcus noted b/l; no xanthelasmas   Neck- no thyromegaly, no cervical LAD, no JVD, no carotid bruit b/l  Respiratory- good air entry b/l, no WRR  Cardiovascular- S1S2, RRR, no MRG appr, no LE edema b/l, distal pulses 2+ b/l  Gastrointestinal- no HSM, no masses  Neurology- moves all extremities, no focal deficits  Psych- normal affect, non-depressed mood  Skin- no lesions, no rashes, no xanthomas     LABS:	                        12.3   9.11  )-----------( 418      ( 27 May 2020 06:10 )             37.3     05-27    139  |  107  |  12  ----------------------------<  87  4.5   |  20<L>  |  0.88    Ca    8.9      27 May 2020 06:10  Mg     2.3     05-27    TPro  7.0  /  Alb  3.9  /  TBili  0.3  /  DBili  <0.2  /  AST  17  /  ALT  10  /  AlkPhos  48  05-27      Cholesterol, Serum: 235 mg/dL (05-27 @ 06:10)  HDL Cholesterol, Serum: 56 mg/dL (05-27 @ 06:10)  Triglycerides, Serum: 188 mg/dL (05-27 @ 06:10)  Direct LDL: 141 mg/dL (05-27 @ 06:10)      ASSESSMENT/RECOMMENDATIONS: 	  Patient's dietary, exercise and overall lifestyle habits were reviewed. The concept of atherosclerosis and its systemic nature was discussed with a focus on the need to get all cardiovascular risk factors to goal. At this time, I would like to make the following recommendations to optimize atherosclerotic risk factors.     RECOMMENDATIONS:   Anti-platelet Therapy: DAPT per interventionalist recommendation.   Lipid Therapy: Patient denies any previous statin or lipid lowering therapy. She was started on atorvastatin 80mg daily in the hospital, with the goal of lowering her LDL-C by 50%.   Hypertension: Blood pressures during this stay were well-controlled.   Mediterranean Diet Score is 7. Some suggestions include continue incorporating 2 or more servings per day of vegetables, fruits, and whole grains. Increase intake of fish and legumes/beans to 2 or more servings per week. Aim to increase intake of healthy fats, such as olive oil and avocados, and have a handful of nuts/seeds most days. Reduce red/processed meat consumption to 2 or fewer times per week.   Exercise: Recommended gradually increasing activity to 30-45 minutes most days of the week once cleared by referring cardiologist. Cardiac rehab might benefit this patient and is covered by major insurance plans (other than co-pays), please refer.   Medication Adherence: Patient has no issues with adherence at this time.   Smoking: This patient is a non-smoker.   Obesity/Overweight: The patient was advised about specific mechanisms such as reduced portions and increased activity for efforts toward weight loss.   Glucometabolic State: Patient's blood sugar is well-controlled at this time. HbA1c today was 5.5%.  Sleep Apnea: The patient is at low risk for sleep apnea.   Psychological Stress: The patient appears to be coping with stressors well at this time.     Thank you for the opportunity to see this patient. Please feel free to contact Prevention if there are any questions, or if you feel that your patient would benefit from continued follow-up visits with the Program.    Swati Galeana, [facility]BC  Cardiovascular Prevention     Donna Guallpa MD  System Director, Cardiovascular Prevention

## 2020-05-27 NOTE — H&P ADULT - NSHPREVIEWOFSYSTEMS_GEN_ALL_CORE
GENERAL, CONSTITUTIONAL : denies recent weight loss, fever, chills  EYES, VISION: denies changes in vision   EARS, NOSE, THROAT: denies hearing loss  HEART, CARDIOVASCULAR: admits to chest pressure, denies arrhythmia, palpitations,  RESPIRATORY: Denies cough, SOB, wheezing, PND, orthopnea  GASTROINTESTINAL: Denies abdominal pain, admits to occasional heartburn, denies bloody stool, dark tarry stool  GENITOURINARY: Denies frequent urination, urgency  MUSCULOSKELETAL denies joint pain or swelling, restricted motion, musculoskeletal pain.   SKIN & INTEGUMENTARY Denies rashes, sores, blisters, blisters, growths.  NEUROLOGICAL: Denies numbness or tingling sensations, sensation loss, burning.   PSYCHIATRIC: Denies nervousness, anxiety, depression  ENDOCRINE Denies heat or cold intolerance, excessive thirst  HEMATOLOGIC/LYMPHATIC: Denies abnormal bleeding, bleeding of any kind

## 2020-05-27 NOTE — H&P ADULT - REASON FOR ADMISSION
Angina Class lll symptoms.  Pt. complaining of intermittent, exertional midsternal chest pressure, radiating between scapula, right jaw and b/l arms X 2 days.  Pt. has a significant family hx of CAD (mother 3CABG X2) and with elevated Troponin (0.07)

## 2020-05-27 NOTE — PROGRESS NOTE ADULT - PROBLEM SELECTOR PLAN 1
Telemetry, ECG, serial CE, Echocardiogram and NPO for Left Heart Catheterization with possible intervention. Troponin 0.07-->0.07  Pt. is precath and consented.  Pt. received Aspirin Ec 325mg PO X1 in ER and started on Hep gtt.   Plavix 600mg PO X1, Lipitor 80mg PO X1 and low dose of Metoprolol tart 12.5mg PO bid started.   /70 Pt with strong FHx of CAD (mother with 3VCABG x 2 at age 46) who presented with exertional chest pressure radiating to the left side jaw and in between the scapula x 2 days. Pt currently CP free, HD stable, physical exam wnl.   - ECG NSR @ 65bpm   - CXR 5/26/20 revealed no acute pathology  - Troponin 0.07 x 3 >> 0.08   - ECHO on 5/27/20 revealed normal LV size and function, normal RV size and systolic function, no significant valvular disease, no pericardial effusion.  - Pt was consented for cardiac catheterization on 5/27/20 with Dr. Cai, consent placed in the chart.   - Pt was given Aspirin 325mg PO X 1 in ER and started on Hep gtt.     - Pt was given Plavix 600mg PO X1 and started on Lipitor 80mg PO daily and Metoprolol tartrate 12.5mg PO BID.   - Pt given NS @ 75cc/hr   - bHCG negative   - Preliminary COVID test negative, awaiting validated result Pt with strong FHx of CAD (mother with 3VCABG x 2 at age 46) who presented with exertional chest pressure radiating to the left side jaw and in between the scapula x 2 days. Pt currently CP free, HD stable, physical exam wnl.   - ECG NSR @ 65bpm   - CXR 5/26/20 revealed no acute pathology  - Troponin 0.07 x 3 >> 0.08   - ECHO on 5/27/20 revealed normal LV size and function, normal RV size and systolic function, no significant valvular disease, no pericardial effusion, EF 63%.   - Pt was consented for cardiac catheterization on 5/27/20 with Dr. Cai, consent placed in the chart.   - Pt was given Aspirin 325mg PO X 1 in ER and started on Hep gtt.     - Pt was given Plavix 600mg PO X1 and started on Lipitor 80mg PO daily and Metoprolol tartrate 12.5mg PO BID.   - Pt given NS @ 75cc/hr   - bHCG negative   - Preliminary COVID test negative, awaiting final result  - Pt is now s/p cardiac cath 5/27/20: 2 V CAD with  mRCA ; ostial 30-50 % LM;  90 % pLAD @ bifurcation; ostial 90 % D1; tortorous LCX luminous irregularities with L-R collaterals from LCx and LAD ; 100 % mRCA  with collaterals L -R; preserved EF 50%; EDP 5; R radial access. Pt to be started on heparin gtt 2 hrs post radial band removal.   - CTS Dr. Metcalf consulted for CABG, f/u recs Pt with strong FHx of CAD (mother with 3VCABG x 2 at age 46) who presented with exertional chest pressure radiating to the left side jaw and in between the scapula x 2 days. Pt currently CP free, HD stable, physical exam wnl.   - ECG NSR @ 65bpm   - CXR 5/26/20 revealed no acute pathology  - Troponin 0.07 x 3 >> 0.08   - ECHO on 5/27/20 revealed normal LV size and function, normal RV size and systolic function, no significant valvular disease, no pericardial effusion, EF 63%.   - Pt was consented for cardiac catheterization on 5/27/20 with Dr. Cai, consent placed in the chart.   - Pt was given Aspirin 325mg PO X 1 in ER and started on Hep gtt.     - Pt was given Plavix 600mg PO X1 and started on Lipitor 80mg PO daily and Metoprolol tartrate 12.5mg PO BID.   - Pt given NS @ 75cc/hr   - bHCG negative   - Preliminary COVID test negative, awaiting final result  - Pt is now s/p cardiac cath 5/27/20: 2 V CAD with  mRCA ; ostial 30-50 % LM;  90 % pLAD @ bifurcation; ostial 90 % D1; tortorous LCX luminous irregularities with L-R collaterals from LCx and LAD ; 100 % mRCA  with collaterals L -R; preserved EF 50%; EDP 5; R radial access.   - Plan for staged PCI of LAD with Dr. Cai on 5/28/20 and Staged  in 6 weeks.  - Pt to be started on heparin gtt 2 hrs post radial band removal until procedure tomorrow  - Pt consented for procedure and consent placed in the chart Pt with strong FHx of CAD (mother with 3VCABG x 2 at age 46) who presented with exertional chest pressure radiating to the left side jaw and in between the scapula x 2 days. Pt currently CP free, HD stable, physical exam wnl.   - ECG NSR @ 65bpm   - CXR 5/26/20 revealed no acute pathology  - Troponin 0.07 x 3 >> 0.08   - ECHO on 5/27/20 revealed normal LV size and function, normal RV size and systolic function, no significant valvular disease, no pericardial effusion, EF 63%.   - Pt was consented for cardiac catheterization on 5/27/20 with Dr. Cai, consent placed in the chart.   - Pt was given Aspirin 325mg PO X 1 in ER and started on Hep gtt.     - Pt was given Plavix 600mg PO X1 and started on Lipitor 80mg PO daily and Metoprolol tartrate 12.5mg PO BID.   - Pt given NS @ 75cc/hr   - bHCG negative   - Preliminary COVID test negative, awaiting final result  - Pt is now s/p cardiac cath 5/27/20: 2 V CAD with  mRCA ; ostial 30-50 % LM;  90 % pLAD @ bifurcation; ostial 90 % D1; tortorous LCX luminous irregularities with L-R collaterals from LCx and LAD ; 100 % mRCA  with collaterals L -R; preserved EF 50%; EDP 5; R radial access.   - Plan for staged PCI of LAD with Dr. Cai on 5/28/20 and Staged  in 6 weeks.  - Pt to be started on heparin gtt 2 hrs post radial band removal until procedure tomorrow  - Pt to receive Brilinta 180mg PO x 1 tonight and continue with 90mg PO BID starting 5/28 per Dr. Fischer   - Pt consented for procedure and consent placed in the chart  - Continue ASA 81mg PO daily and Lipitor 80mg PO daily   - Continue Metoprolol tartrate 12.5mg PO daily  - NPO after MN Pt with strong FHx of CAD (mother with 3VCABG x 2 at age 46) who presented with exertional chest pressure radiating to the left side jaw and in between the scapula x 2 days. Pt currently CP free, HD stable, physical exam wnl.   - ECG NSR @ 65bpm, no STT changes  - CXR 5/26/20 revealed no acute pathology  - Troponin 0.07 x 3 >> 0.08   - ECHO on 5/27/20 revealed normal LV size and function, normal RV size and systolic function, no significant valvular disease, no pericardial effusion, EF 63%.   - Pt was consented for cardiac catheterization on 5/27/20 with Dr. Cai, consent placed in the chart.   - Pt was given Aspirin 325mg PO X 1 in ER and started on Hep gtt.     - Pt was given Plavix 600mg PO X1 and started on Lipitor 80mg PO daily and Metoprolol tartrate 12.5mg PO BID.   - Pt given NS @ 75cc/hr   - bHCG negative   - Preliminary COVID test negative, awaiting final result  - Pt is now s/p cardiac cath 5/27/20: 2 V CAD with  mRCA ; ostial 30-50 % LM;  90 % pLAD @ bifurcation; ostial 90 % D1; tortorous LCX luminous irregularities with L-R collaterals from LCx and LAD ; 100 % mRCA  with collaterals L -R; preserved EF 50%; EDP 5; R radial access.   - Plan for staged PCI of LAD with Dr. Cai on 5/28/20 and Staged  in 6 weeks.  - Pt to be started on heparin gtt 2 hrs post radial band removal until procedure tomorrow  - Pt to receive Brilinta 180mg PO x 1 tonight and continue with 90mg PO BID starting 5/28 per Dr. Fischer   - Pt consented for procedure and consent placed in the chart  - Continue ASA 81mg PO daily and Lipitor 80mg PO daily   - Continue Metoprolol tartrate 12.5mg PO daily  - NPO after MN Pt with strong FHx of CAD (mother with 3VCABG x 2 at age 46) who presented with exertional chest pressure radiating to the left side jaw and in between the scapula x 2 days. Pt currently CP free, HD stable, physical exam wnl.   - ECG NSR @ 65bpm, no STT changes  - CXR 5/26/20 revealed no acute pathology  - Troponin 0.07 x 3 >> 0.08   - ECHO on 5/27/20 revealed normal LV size and function, normal RV size and systolic function, no significant valvular disease, no pericardial effusion, EF 63%.   - Pt was consented for cardiac catheterization on 5/27/20 with Dr. Cai, consent placed in the chart.   - Pt was given Aspirin 325mg PO X 1 in ER and started on Hep gtt.     - Pt was given Plavix 600mg PO X1 and started on Lipitor 80mg PO daily and Metoprolol tartrate 12.5mg PO BID.   - Pt given NS @ 75cc/hr   - bHCG negative   - Preliminary COVID test negative, awaiting final result  - Pt is now s/p cardiac cath 5/27/20: 2 V CAD with  mRCA ; ostial 30-50 % LM;  90 % pLAD @ bifurcation; ostial 90 % D1; tortorous LCX luminous irregularities with L-R collaterals from LCx and LAD ; 100 % mRCA  with collaterals L -R; preserved EF 50%; EDP 5; R radial access.   - Plan for staged PCI of LAD with Dr. Cai on 5/28/20 and Staged PCI of mRCA in 4-6 weeks.  - Pt to be started on heparin gtt 2 hrs post radial band removal until procedure tomorrow  - Pt to receive Brilinta 180mg PO x 1 tonight and continue with 90mg PO BID starting 5/28 per Dr. Fischer   - Pt consented for procedure and consent placed in the chart  - Continue ASA 81mg PO daily and Lipitor 80mg PO daily   - Continue Metoprolol tartrate 12.5mg PO daily  - NPO after MN

## 2020-05-27 NOTE — H&P ADULT - NSHPSOCIALHISTORY_GEN_ALL_CORE
lives with .  Denies Tobacco and illicit drug use.  EtOH Occasion.  Last menstrual  was one month ago.

## 2020-05-27 NOTE — PROGRESS NOTE ADULT - PROBLEM SELECTOR PLAN 2
F/U lipid panel and LFT's     Lipitor 80mg PO X 1 tomorrow TChol 325, Trig 188, , HDL 56  - Pt reports h/o hyperlipidemia being controlled with diet on no meds at home; pt was started on Lipitor 80mg PO QD

## 2020-05-27 NOTE — H&P ADULT - NSHPPHYSICALEXAM_GEN_ALL_CORE
T(C): 36.6 (05-26-20 @ 23:56), Max: 36.9 (05-26-20 @ 18:35)  HR: 82 (05-26-20 @ 23:56) (65 - 93)  BP: 155/69 (05-26-20 @ 23:56) (129/79 - 171/79)  RR: 16 (05-26-20 @ 23:56) (16 - 18)  SpO2: 97% (05-26-20 @ 23:56) (97% - 98%)  Wt(kg): --    Appearance: Normal	  HEENT:   Normal oral mucosa, PERRL, EOMI	  Neck: Supple, - JVD; No Carotid Bruit and 2+ pulses B/L  Cardiovascular: Normal S1 S2, No JVD, No murmurs  Respiratory: Lungs clear to auscultation//No Rales, Rhonchi, Wheezing	  Gastrointestinal:  Soft, Non-tender, + BS	  Skin: No rashes, No ecchymoses, No cyanosis  Extremities: Normal range of motion, No clubbing, cyanosis or edema  Vascular: Femoral pulses 2+ b/l without bruit, DP 2+ b/l, PT 1+ b/l  Neurologic: Non-focal  Psychiatry: A & O x 3, Mood & affect appropriate

## 2020-05-27 NOTE — PROGRESS NOTE ADULT - ASSESSMENT
50 y/o female with significant family hx of CAD (Mother X2 3VCABG at age 46) and PMHx of HLD (borderline; diet controlled) and Essential Thrombocytosis presents to [facility] ER this evening, 5/26/20, from Urgent Care, complaining of intermittent, exertional, chest pressure radiating between scapula, up right jaw and down b/l arms X2 days. 50 y/o F with significant family hx of CAD (Mother X2 3VCABG at age 46) and PMHx of HLD (borderline; diet controlled) and Essential Thrombocytosis who presented to [facility] ER on 5/26/20 from Urgent Care reporting intermittent, exertional, chest pressure radiating between scapula, up right side of the jaw and down b/l arms X 2 days. ECG NSR @ 65bpm, troponin peaked at 0.08, CXR unremarkable. Pt was given load of ASA 325mg PO x 1, Plavix 600mg PO x 1 and started on heparin gtt. COVID prelim test negative. Pt was admitted for r/o ACS and plan for cardiac catheterization on 5/27/20. 50 y/o F with significant family hx of CAD (Mother X2 3VCABG at age 46) and PMHx of HLD (borderline; diet controlled) and Essential Thrombocytosis who presented to [facility] ER on 5/26/20 from Urgent Care reporting intermittent, exertional, chest pressure radiating between scapula, up right side of the jaw and down b/l arms X 2 days. ECG NSR @ 65bpm, troponin peaked at 0.08, CXR unremarkable. Pt was given load of ASA 325mg PO x 1, Plavix 600mg PO x 1 and started on heparin gtt. COVID prelim test negative. Pt was admitted for r/o ACS and is s/p cardiac catheterization 5/27/20 revealing 2V CAD with plan for staged PCI of LAD on 5/28/20 and staged PCI of RCA in 4-6 weeks.

## 2020-05-27 NOTE — H&P ADULT - PROBLEM SELECTOR PLAN 1
Telemetry, ECG, serial CE, Echocardiogram and NPO for Left Heart Catheterization with possible intervention. Troponin 0.07-->0.07  Pt. is precath and consented.  Pt. received Aspirin Ec 325mg PO X1 in ER and started on Hep gtt.   Plavix 600mg PO X1, Lipitor 80mg PO X1 and low dose of Metoprolol tart 12.5mg PO bid started.   /70

## 2020-05-28 ENCOUNTER — TRANSCRIPTION ENCOUNTER (OUTPATIENT)
Age: 50
End: 2020-05-28

## 2020-05-28 DIAGNOSIS — I21.4 NON-ST ELEVATION (NSTEMI) MYOCARDIAL INFARCTION: ICD-10-CM

## 2020-05-28 LAB
ANION GAP SERPL CALC-SCNC: 11 MMOL/L — SIGNIFICANT CHANGE UP (ref 5–17)
APTT BLD: 168.9 SEC — CRITICAL HIGH (ref 27.5–36.3)
APTT BLD: 44 SEC — HIGH (ref 27.5–36.3)
APTT BLD: 53.4 SEC — HIGH (ref 27.5–36.3)
BASOPHILS # BLD AUTO: 0.05 K/UL — SIGNIFICANT CHANGE UP (ref 0–0.2)
BASOPHILS NFR BLD AUTO: 0.6 % — SIGNIFICANT CHANGE UP (ref 0–2)
BUN SERPL-MCNC: 11 MG/DL — SIGNIFICANT CHANGE UP (ref 7–23)
CALCIUM SERPL-MCNC: 8.9 MG/DL — SIGNIFICANT CHANGE UP (ref 8.4–10.5)
CHLORIDE SERPL-SCNC: 108 MMOL/L — SIGNIFICANT CHANGE UP (ref 96–108)
CO2 SERPL-SCNC: 19 MMOL/L — LOW (ref 22–31)
CREAT SERPL-MCNC: 0.8 MG/DL — SIGNIFICANT CHANGE UP (ref 0.5–1.3)
EOSINOPHIL # BLD AUTO: 0.26 K/UL — SIGNIFICANT CHANGE UP (ref 0–0.5)
EOSINOPHIL NFR BLD AUTO: 3 % — SIGNIFICANT CHANGE UP (ref 0–6)
GLUCOSE SERPL-MCNC: 92 MG/DL — SIGNIFICANT CHANGE UP (ref 70–99)
HCT VFR BLD CALC: 37.6 % — SIGNIFICANT CHANGE UP (ref 34.5–45)
HCT VFR BLD CALC: 38.4 % — SIGNIFICANT CHANGE UP (ref 34.5–45)
HGB BLD-MCNC: 12.5 G/DL — SIGNIFICANT CHANGE UP (ref 11.5–15.5)
HGB BLD-MCNC: 12.6 G/DL — SIGNIFICANT CHANGE UP (ref 11.5–15.5)
IMM GRANULOCYTES NFR BLD AUTO: 0.3 % — SIGNIFICANT CHANGE UP (ref 0–1.5)
INR BLD: 1.09 — SIGNIFICANT CHANGE UP (ref 0.88–1.16)
LYMPHOCYTES # BLD AUTO: 2.61 K/UL — SIGNIFICANT CHANGE UP (ref 1–3.3)
LYMPHOCYTES # BLD AUTO: 29.8 % — SIGNIFICANT CHANGE UP (ref 13–44)
MAGNESIUM SERPL-MCNC: 2.3 MG/DL — SIGNIFICANT CHANGE UP (ref 1.6–2.6)
MCHC RBC-ENTMCNC: 30 PG — SIGNIFICANT CHANGE UP (ref 27–34)
MCHC RBC-ENTMCNC: 30.6 PG — SIGNIFICANT CHANGE UP (ref 27–34)
MCHC RBC-ENTMCNC: 32.8 GM/DL — SIGNIFICANT CHANGE UP (ref 32–36)
MCHC RBC-ENTMCNC: 33.2 GM/DL — SIGNIFICANT CHANGE UP (ref 32–36)
MCV RBC AUTO: 91.4 FL — SIGNIFICANT CHANGE UP (ref 80–100)
MCV RBC AUTO: 91.9 FL — SIGNIFICANT CHANGE UP (ref 80–100)
MONOCYTES # BLD AUTO: 0.7 K/UL — SIGNIFICANT CHANGE UP (ref 0–0.9)
MONOCYTES NFR BLD AUTO: 8 % — SIGNIFICANT CHANGE UP (ref 2–14)
NEUTROPHILS # BLD AUTO: 5.1 K/UL — SIGNIFICANT CHANGE UP (ref 1.8–7.4)
NEUTROPHILS NFR BLD AUTO: 58.3 % — SIGNIFICANT CHANGE UP (ref 43–77)
NRBC # BLD: 0 /100 WBCS — SIGNIFICANT CHANGE UP (ref 0–0)
NRBC # BLD: 0 /100 WBCS — SIGNIFICANT CHANGE UP (ref 0–0)
PLATELET # BLD AUTO: 398 K/UL — SIGNIFICANT CHANGE UP (ref 150–400)
PLATELET # BLD AUTO: 422 K/UL — HIGH (ref 150–400)
POTASSIUM SERPL-MCNC: 3.9 MMOL/L — SIGNIFICANT CHANGE UP (ref 3.5–5.3)
POTASSIUM SERPL-SCNC: 3.9 MMOL/L — SIGNIFICANT CHANGE UP (ref 3.5–5.3)
PROTHROM AB SERPL-ACNC: 12.5 SEC — SIGNIFICANT CHANGE UP (ref 10–12.9)
RBC # BLD: 4.09 M/UL — SIGNIFICANT CHANGE UP (ref 3.8–5.2)
RBC # BLD: 4.2 M/UL — SIGNIFICANT CHANGE UP (ref 3.8–5.2)
RBC # FLD: 13.4 % — SIGNIFICANT CHANGE UP (ref 10.3–14.5)
RBC # FLD: 13.5 % — SIGNIFICANT CHANGE UP (ref 10.3–14.5)
SARS-COV-2 RNA SPEC QL NAA+PROBE: SIGNIFICANT CHANGE UP
SODIUM SERPL-SCNC: 138 MMOL/L — SIGNIFICANT CHANGE UP (ref 135–145)
TSH SERPL-MCNC: 1.52 UIU/ML — SIGNIFICANT CHANGE UP (ref 0.35–4.94)
WBC # BLD: 8.75 K/UL — SIGNIFICANT CHANGE UP (ref 3.8–10.5)
WBC # BLD: 8.77 K/UL — SIGNIFICANT CHANGE UP (ref 3.8–10.5)
WBC # FLD AUTO: 8.75 K/UL — SIGNIFICANT CHANGE UP (ref 3.8–10.5)
WBC # FLD AUTO: 8.77 K/UL — SIGNIFICANT CHANGE UP (ref 3.8–10.5)

## 2020-05-28 PROCEDURE — 93454 CORONARY ARTERY ANGIO S&I: CPT | Mod: 26,59

## 2020-05-28 PROCEDURE — 92928 PRQ TCAT PLMT NTRAC ST 1 LES: CPT | Mod: LM

## 2020-05-28 PROCEDURE — 92978 ENDOLUMINL IVUS OCT C 1ST: CPT | Mod: 26,LM

## 2020-05-28 PROCEDURE — 99233 SBSQ HOSP IP/OBS HIGH 50: CPT

## 2020-05-28 RX ORDER — METOPROLOL TARTRATE 50 MG
25 TABLET ORAL DAILY
Refills: 0 | Status: DISCONTINUED | OUTPATIENT
Start: 2020-05-28 | End: 2020-05-29

## 2020-05-28 RX ORDER — ACETAMINOPHEN 500 MG
650 TABLET ORAL EVERY 6 HOURS
Refills: 0 | Status: DISCONTINUED | OUTPATIENT
Start: 2020-05-28 | End: 2020-05-28

## 2020-05-28 RX ORDER — HEPARIN SODIUM 5000 [USP'U]/ML
800 INJECTION INTRAVENOUS; SUBCUTANEOUS
Qty: 25000 | Refills: 0 | Status: DISCONTINUED | OUTPATIENT
Start: 2020-05-28 | End: 2020-05-28

## 2020-05-28 RX ORDER — SODIUM CHLORIDE 9 MG/ML
500 INJECTION INTRAMUSCULAR; INTRAVENOUS; SUBCUTANEOUS
Refills: 0 | Status: DISCONTINUED | OUTPATIENT
Start: 2020-05-28 | End: 2020-05-29

## 2020-05-28 RX ORDER — ACETAMINOPHEN 500 MG
650 TABLET ORAL ONCE
Refills: 0 | Status: COMPLETED | OUTPATIENT
Start: 2020-05-28 | End: 2020-05-28

## 2020-05-28 RX ADMIN — Medication 81 MILLIGRAM(S): at 09:31

## 2020-05-28 RX ADMIN — HEPARIN SODIUM 800 UNIT(S)/HR: 5000 INJECTION INTRAVENOUS; SUBCUTANEOUS at 09:38

## 2020-05-28 RX ADMIN — TICAGRELOR 90 MILLIGRAM(S): 90 TABLET ORAL at 05:55

## 2020-05-28 RX ADMIN — ATORVASTATIN CALCIUM 80 MILLIGRAM(S): 80 TABLET, FILM COATED ORAL at 21:26

## 2020-05-28 RX ADMIN — Medication 650 MILLIGRAM(S): at 20:23

## 2020-05-28 RX ADMIN — Medication 25 MILLIGRAM(S): at 19:13

## 2020-05-28 RX ADMIN — SODIUM CHLORIDE 75 MILLILITER(S): 9 INJECTION INTRAMUSCULAR; INTRAVENOUS; SUBCUTANEOUS at 14:10

## 2020-05-28 RX ADMIN — Medication 650 MILLIGRAM(S): at 01:22

## 2020-05-28 RX ADMIN — TICAGRELOR 90 MILLIGRAM(S): 90 TABLET ORAL at 19:13

## 2020-05-28 RX ADMIN — HEPARIN SODIUM 3000 UNIT(S): 5000 INJECTION INTRAVENOUS; SUBCUTANEOUS at 03:45

## 2020-05-28 RX ADMIN — HEPARIN SODIUM 1300 UNIT(S)/HR: 5000 INJECTION INTRAVENOUS; SUBCUTANEOUS at 03:42

## 2020-05-28 NOTE — PROGRESS NOTE ADULT - PROBLEM SELECTOR PLAN 1
Pt currently CP free; strong FHx of CAD (mother s/p 3VCABG x 2 at age 46)  -Trop peak 0.08; EKG non ischemic  -Echo normal EF, no WMA, normal RV size/function, no valvular disease  -Pt s/p Cardiac cath on 5/27 revealing 2VCAD with  mRCA, ostial LM 30-50%, pLAD 90% @ bifurcation, ostial D1 90%, tortorous LCX luminous irregularities with L-R collaterals from LCx and LAD, mRCA  100% mRCA with collaterals L -R  -Original plan for CTSx consult post cath however cath images reviewed by Dr. Cai and Dr. Lewis with plan for staged PCI of LAD today and staged PCI of RCA in 6 weeks  -s/p ASA 325mg, Plavix 600mg prior to cath; s/p reload of Brilinta 180mg post cath on 5/27  -Discontinue Heparin gtt prior to cath  -Continue ASA 81mg, Brilinta 90mg BID and Atorvastatin 80mg; Start Toprol XL 25mg QD  -Prevention Cardiology consulted, f/u recs

## 2020-05-28 NOTE — PROVIDER CONTACT NOTE (CHANGE IN STATUS NOTIFICATION) - ACTION/TREATMENT ORDERED:
RADAMES Love and Jena Fernandez notified and aware- Pressure applied/Epi/Lido applied and will continue to monitor

## 2020-05-28 NOTE — PROGRESS NOTE ADULT - ASSESSMENT
50 y/o Female with significant family hx of CAD (Mother X2 3VCABG at age 46) and PMHx of HLD (diet controlled) and Essential Thrombocytosis who presented to [facility] ER on 5/26/20 from Urgent Care reporting intermittent, exertional, chest pressure radiating between scapula, up right side of the jaw and down b/l arms X 2 days. Pt ruled in NSTEMI with peak Trop 0.08 and was admitted to Cardiac Telemetry for further management. Pt now s/p Cardiac catheterization 5/27/20 revealing 2V CAD with plan for staged PCI of LAD today and staged PCI of RCA in 4-6 weeks.

## 2020-05-28 NOTE — DISCHARGE NOTE PROVIDER - INSTRUCTIONS
We recommend a heart healthy DASH diet or a Mediterranean diet for our patients. We recommend a heart healthy DASH diet or a Mediterranean diet, low in sodium, cholesterol and fats.

## 2020-05-28 NOTE — DISCHARGE NOTE PROVIDER - CARE PROVIDERS DIRECT ADDRESSES
[email or web address]

## 2020-05-28 NOTE — PROGRESS NOTE ADULT - SUBJECTIVE AND OBJECTIVE BOX
CORONARY ANGIOGRAPHY  05/28/2020    Indication: NSTEMI    Conclusion  Dual access- right radial(diagnostic), rt femoral (intervention)  Syntax score- intermediate (26)    LMCA- 50-60% ostial stenosis (pressure damping and ventricularization with guide engagement)  LAD- prox 90% stenosis, mid 80% stenosis  LCx- luminal irregularities  RCA- mid RCA short  ( 18-20mm) with L--> R collaterals (rentrop grade 3), JCTO score 0    Intervention  Succesful PCI to mid LAD with 2.75 x 38mm promus DANYELLE with excellent result, KALANI 3 flow  Succesful PCI to prox LAD with 3 x16 mm promus DANYELLE with excellent result, KALANI 3 flow  Succesful IVUS guided PCI to left main ostium with 4.0 x 8mm promus DANYELLE with excellent result, KALANI 3 flow.     Recommendations  ASA and brilinta for 1 year  Return for intervention to RCA in 5 weeks    Access: R CFA-perclose, R radial artery- TR band

## 2020-05-28 NOTE — PROGRESS NOTE ADULT - ATTENDING COMMENTS
49F w/ strong family hx of CAD, no other significant medical history p/w CP -> ruled in for NSTEMI    -CAD - p/w NSTEMI - s/p LHC today w/ DANYELLE x 1 to oLM and DANYELLE x 2 to prox and mid LAD; Staged PCI of RCA  in 4-6 weeks; c/w ASA, Brilinta and Lipitor; Change Lopressor to Toprol 25 for tmrw am; TTE: Normal EF no significant valvular disease  -DASH die  -DVT PPx  -Full Code  -Dispo: Cardiac Tele; d/c home tmrw    Pedrito Fischer MD  Cardiology Attending
49F w/ strong family hx of CAD, no other significant medical history p/w CP -> ruled in for NSTEMI    -CAD - p/w NSTEMI - started on DAPT and Heparin gtt and Lipitor 80, now s/p LHC: oLM 30-50%; pLAD 90%; oD1 90%; mRCA  -> Case d/w CTS and patient - decision made for Multivessel staged PCI w/ LAD intervention tomorrow. c/w Heparin gtt, plavix dc'd, load Brilinta 180, c/w ASA and Statin. c/w Lopressor 12.5 BID; TTE w/ normal EF, no significant valvular disease.  -DASH diet; NPO after MN  -DVT PPx: Hep gtt  -Full Code  -Dispo: Cardiac Tele    Pedrito Fischer MD  Cardiology Attending

## 2020-05-28 NOTE — DISCHARGE NOTE PROVIDER - HOSPITAL COURSE
Pt is 50yo F w/ FHx of CAD (mother 3VCABG x2 at ag 46) and PMHx of HLD (borderline; diet controlled) and Essential Thrombocytosis who presented to [facility] ER at 5/26/2020 sent from urgent care endorsing intermittent, exertional CP radiating to back, right jaw and down B/L arms x2 days. Pt described CP as dull pressure/burning sensation, rated 6/10, episodes lasting about 3 minutes. Reports episodes have come with increasing intensity and frequency. Pt denied fever, sick contacts, HA, SOB, dizziness, diaphoresis, abdominal discomfort, and N/V. EKG on admission shows NSR w/o acute ischemic change; Trop 0.07 x2. Pt was loaded with ASA 325mg PO x1 and started on Heparin gtt. Due to patient's significant FHx, PMHx and symtpoms, admitted to cardiology/telemetry for cardiac catheterization with possible intervention.        Patient had an ECHO on 5/27/20 revealing normal LV size/function, normal RV size/function, no significant valvular disease, no pericardial effusion, EF 63%.        Patient s/p cardiac catheterization on 5/27/2020 revealing 2V CAD w/  mRCA, ostial LM 30-50%, pLAD 90% at bifuracation, ostial D1 90%, tortuous LCx w/ luminal irregularities w/ L-R collaterals from LCx and LAD; preserved EF 50%, EDP 5. Pt w/ right radial access. Due to findings, no intervention at that time for possible CT Surgery consult. It was decided that best approach would be a staged PCI.         Patient s/p cardiac catheterization 5/28/2020 w/ DANYELLE x1 LM and DANYELLE x2 to p/mLAD; residual mRCA  w/ L-R collaterals; LCx mild luminal irregularities; R radial and R groin (perclose) access sites. Right groin access site oozed - quick clot applied.         Patient seen and evaluated today Pt is 50yo F w/ FHx of CAD (mother 3VCABG x2 at ag 46) and PMHx of HLD (borderline; diet controlled) and Essential Thrombocytosis who presented to [facility] ER at 5/26/2020 sent from urgent care endorsing intermittent, exertional CP radiating to back, right jaw and down B/L arms x2 days. Pt described CP as dull pressure/burning sensation, rated 6/10, episodes lasting about 3 minutes. Reports episodes have come with increasing intensity and frequency. Pt denied fever, sick contacts, HA, SOB, dizziness, diaphoresis, abdominal discomfort, and N/V. EKG on admission shows NSR w/o acute ischemic change; Trop 0.07 x2. Pt was loaded with ASA 325mg PO x1 and started on Heparin gtt. Due to patient's significant FHx, PMHx and symtpoms, admitted to cardiology/telemetry for cardiac catheterization with possible intervention.        Patient had an ECHO on 5/27/20 revealing normal LV size/function, normal RV size/function, no significant valvular disease, no pericardial effusion, EF 63%.        Patient s/p cardiac catheterization on 5/27/2020 revealing 2V CAD w/  mRCA, ostial LM 30-50%, pLAD 90% at bifuracation, ostial D1 90%, tortuous LCx w/ luminal irregularities w/ L-R collaterals from LCx and LAD; preserved EF 50%, EDP 5. Pt w/ right radial access. Due to findings, no intervention at that time for possible CT Surgery consult. It was decided that best approach would be a staged PCI.         Patient s/p cardiac catheterization 5/28/2020 w/ DANYELLE x1 LM and DANYELLE x2 to p/mLAD; residual mRCA  w/ L-R collaterals; LCx mild luminal irregularities; R radial and R groin (perclose) access sites. Right groin access site oozed - quick clot applied.         Patient seen and evaluated on 5/29/2020. Physical exam revealed _______________________. Telemetry _______. EKG ____. Labs ____.         Labs, vitals, telemetry and hospital course reviewed with  ___________ and patient deemed stable for discharge home.         Patient's Discharge Medications: Aspirin 81mg PO QD, Brilinta 90mg PO BID, Atorvastatin 80mg PO QHS, Toprol 25mg PO QD.        Patient provided access site management instructions, and is instructed to follow up with his cardiologist ( ___________) within 1-2 weeks.        Patient will follow up for staged PCI in 4-6 weeks. 48 y/o Female with significant family hx of CAD (Mother X2 3VCABG at age 46) and PMHx of HLD (diet controlled) and Essential Thrombocytosis who presented to [facility] ER on 5/26/20 from Urgent Care reporting intermittent, exertional, chest pressure radiating between scapula, up right side of the jaw and down b/l arms X 2 days. Pt ruled in NSTEMI with peak Trop 0.08 and was admitted to Cardiac Telemetry for further management. Pt was loaded with ASA 325mg, Plavix 600mg and started on a Heparin gtt. Pt had an Echo revealing normal LV size/function, normal RV size/function, no significant valvular disease. Pt s/p cardiac cath on 5/27/20 revealing 2V CAD w/  mRCA, ostial LM 30-50%, pLAD 90% at bifurcation, ostial D1 90%, tortuous LCx w/ luminal irregularities w/ L-R collaterals from LCx and LAD; preserved EF 50%, EDP 5, access R radial. Due to findings, no intervention was performed and CTSx was consulted. As discussed with CTSx and Interventional, plan was to proceed with staged PCI. Pt was reloaded with Brilinta 180mg. Pt now s/p cardiac cath on 5/28/20 with DANYELLE x1 LM and DANYELLE x2 to p/mLAD; residual mRCA  w/ L-R collaterals; LCx mild luminal irregularities; R radial and R groin (perclose) access sites. Further plan for pt to return in 6 weeks for staged PCI of RCA . Pt started on Lipitor 80mg and Toprol XL 25mg for CAD management. Pt seen and examined at bedside this AM without any complaints or events overnight. VSS, labs and telemetry reviewed and pt stable for discharge as discussed with Dr. Fischer. Pt received appropriate discharge instructions, including medication regimen, access site management and follow up with Dr. Guallpa in 1 week and Dr. Lewis in 2 weeks to schedule for staged PCI. 48 y/o Female with significant family hx of CAD (Mother X2 3VCABG at age 46) and PMHx of HLD (diet controlled) and Essential Thrombocytosis who presented to [facility] ER on 5/26/20 from Urgent Care reporting intermittent, exertional, chest pressure radiating between scapula, up right side of the jaw and down b/l arms X 2 days. Pt ruled in NSTEMI with peak Trop 0.08 and was admitted to Cardiac Telemetry for further management. Pt was loaded with ASA 325mg, Plavix 600mg and started on a Heparin gtt. Pt had an Echo revealing normal LV size/function, normal RV size/function, no significant valvular disease. Pt s/p cardiac cath on 5/27/20 revealing 2V CAD w/  mRCA, ostial LM 30-50%, pLAD 90% at bifurcation, ostial D1 90%, tortuous LCx w/ luminal irregularities w/ L-R collaterals from LCx and LAD; preserved EF 50%, EDP 5, access R radial. Due to findings, no intervention was performed and CTSx was consulted. As discussed with CTSx and Interventional, plan was to proceed with staged PCI. Pt was reloaded with Brilinta 180mg. Pt now s/p cardiac cath on 5/28/20 with DANYELLE x1 LM and DANYELLE x2 to p/mLAD; residual mRCA  w/ L-R collaterals; LCx mild luminal irregularities; R radial and R groin (perclose) access sites. Further plan for pt to return in 6 weeks for staged PCI of RCA . Pt started on Lipitor 80mg and Toprol XL 25mg for CAD management. Pt seen and examined at bedside this AM without any complaints or events overnight. VSS, labs and telemetry reviewed and pt stable for discharge as discussed with Dr. Fischer. Pt received appropriate discharge instructions, including medication regimen, access site management and follow up with Dr. Guallpa 6/4/20 at 1030AM and Dr. Lewis in 2 weeks to schedule for staged PCI.

## 2020-05-28 NOTE — DISCHARGE NOTE PROVIDER - REASON FOR ADMISSION
Angina Class lll symptoms.  Pt. complaining of intermittent, exertional midsternal chest pressure, radiating between scapula, right jaw and b/l arms X 2 days.  Pt. has a significant family hx of CAD (mother 3CABG X2) and with elevated Troponin (0.07) NSTEMI

## 2020-05-28 NOTE — PROGRESS NOTE ADULT - SUBJECTIVE AND OBJECTIVE BOX
Cardiology PA Adult Progress Note    Subjective Assessment: Pt seen and examined at bedside this AM without any complaints or events overnight. Pt reports she feels well and denies any CP, palpitations, dizziness, lightheadedness, SOB, or access site pain of wrist.  ROS negative except for HPI or subjective.  	  MEDICATIONS:  metoprolol succinate ER 25 milliGRAM(s) Oral daily  atorvastatin 80 milliGRAM(s) Oral at bedtime  aspirin enteric coated 81 milliGRAM(s) Oral daily  ticagrelor 90 milliGRAM(s) Oral every 12 hours	    VITAL SIGNS:  T(C): 36.8 (05-28-20 @ 10:11), Max: 37.3 (05-27-20 @ 20:43)  HR: 65 (05-28-20 @ 09:32) (61 - 85)  BP: 129/75 (05-28-20 @ 09:32) (103/55 - 150/67)  RR: 16 (05-28-20 @ 09:32) (16 - 19)  SpO2: 96% (05-28-20 @ 09:32) (94% - 99%)    PHYSICAL EXAM:  Appearance: Normal	  HEENT:   Normal oral mucosa, PERRL, EOMI	  Neck: Supple, - JVD; No Carotid Bruit   Cardiovascular: Normal S1 S2, No JVD, No murmurs,   Respiratory: Lungs clear to auscultation, No Rales, Rhonchi, Wheezing	  Gastrointestinal:  Soft, Non-tender, + BS	  Skin: No rashes, No ecchymoses, No cyanosis  Extremities: Normal range of motion, No clubbing, cyanosis or edema  Vascular: Peripheral pulses palpable 2+ bilaterally  Neurologic: Non-focal  Psychiatry: A & O x 3, Mood & affect appropriate  	    LABS:	 	             12.6   8.75  )-----------( 398      ( 28 May 2020 07:06 )             38.4     138  |  108  |  11  ----------------------------<  92  3.9   |  19<L>  |  0.80    Ca    8.9      28 May 2020 07:06  Mg     2.3     05-28  TPro  7.0  /  Alb  3.9  /  TBili  0.3  /  DBili  <0.2  /  AST  17  /  ALT  10  /  AlkPhos  48  05-27  TSH: Thyroid Stimulating Hormone, Serum: 1.518 uIU/mL (05-28 @ 07:06)  PT/INR - ( 28 May 2020 07:06 )   PT: 12.5 sec;   INR: 1.09     PTT - ( 28 May 2020 07:06 )  PTT:168.9 sec

## 2020-05-28 NOTE — DISCHARGE NOTE PROVIDER - NSDCMRMEDTOKEN_GEN_ALL_CORE_FT
aspirin 81 mg oral delayed release tablet: 1 tab(s) orally once a day  atorvastatin 80 mg oral tablet: 1 tab(s) orally once a day (at bedtime)  metoprolol succinate 25 mg oral tablet, extended release: 1 tab(s) orally once a day  ticagrelor 90 mg oral tablet: 1 tab(s) orally every 12 hours aspirin 81 mg oral delayed release tablet: 1 tab(s) orally once a day  atorvastatin 80 mg oral tablet: 1 tab(s) orally once a day (at bedtime)  Brilinta (ticagrelor) 90 mg oral tablet: 1 tab(s) orally 2 times a day   metoprolol succinate 25 mg oral tablet, extended release: 1 tab(s) orally once a day  ticagrelor 90 mg oral tablet: 1 tab(s) orally every 12 hours

## 2020-05-28 NOTE — DISCHARGE NOTE PROVIDER - CARE PROVIDER_API CALL
Donna Guallpa  CARDIOVASCULAR DISEASE  [address] [place] [address]  Phone: [phone]  Fax: [phone]  Follow Up Time: 1 week    Vj Lewis  CARDIOLOGY  [address] [address]  Phone: [phone]  Fax: [phone]  Follow Up Time: 2 weeks Donna Guallpa  CARDIOVASCULAR DISEASE  [address] [place] [address]  Phone: [phone]  Fax: [phone]  Scheduled Appointment: 06/04/2020 10:30 AM    Vj Lewis  CARDIOLOGY  [address] [address]  Phone: [phone]  Fax: [phone]  Follow Up Time: 2 weeks

## 2020-05-28 NOTE — DISCHARGE NOTE PROVIDER - NSDCCPCAREPLAN_GEN_ALL_CORE_FT
PRINCIPAL DISCHARGE DIAGNOSIS  Diagnosis: NSTEMI (non-ST elevated myocardial infarction)  Assessment and Plan of Treatment: You were admitted to the hospital for a NSTEMI, which is a diagnosis that is included in Acute Coronary Syndrome. These means that you had blockages in the arteries that supply blood to your heart, which was causes your symptoms of chest pain to manifest. Because of this you underwent a cardiac catheterization, which is procedure that allows us to look at these arteries, as well as place stents in them if they are significantly blocked. You received 1 stent to your Left Main artery, and 2 stents to your proximal/mid Left Anterior Descending artery. PLEASE CONTINUE TAKING ASPIRIN 81MG BY MOUTH DAILY AND BRILINTA 90MG BY MOUTH TWICE DAILY. DO NOT STOP THESE MEDICATIONS FOR ANY REASON AS THEY ARE KEEPING YOUR STENTS OPEN AND PREVENTING A HEART ATTACK. You had two of these catheterizatio procedures, and have two access sites, one in your right wrist, and another in your right groin. Avoid strenuous activity or heavy lifting for the next 5 days. Do not take a bath or swim for the next 5 days. You may show. For any bleeding or hematoma  You underwent a cardiac angiogram and received a stent to the ___ artery. PLEASE CONTINUE ASPIRIN 81MG DAILY AND PLAVIX 75MG DAILY. DO NOT STOP THESE MEDICATIONS FOR ANY REASON AS THEY ARE KEEPING YOUR STENT OPEN AND PREVENTING A HEART ATTACK. Avoid strenuous activity or heavy lifting for the next five days. Do not take a bath or swim for the next five days; you may shower. For any bleeding or hematoma formation (hardened blood collection under the skin) at the access site of ____ please hold pressure and go to the emergency room. Please follow up with  ___ in 1-2 weeks. For recurrent chest pain, please call your doctor or go to the emergency room.      SECONDARY DISCHARGE DIAGNOSES  Diagnosis: Essential thrombocytosis  Assessment and Plan of Treatment:     Diagnosis: HLD (hyperlipidemia)  Assessment and Plan of Treatment: PRINCIPAL DISCHARGE DIAGNOSIS  Diagnosis: NSTEMI (non-ST elevated myocardial infarction)  Assessment and Plan of Treatment: You were admitted to the hospital for a NSTEMI, which is a diagnosis that is included in Acute Coronary Syndrome. These means that you had blockages in the arteries that supply blood to your heart, which was causes your symptoms of chest pain to manifest. Because of this you underwent a cardiac catheterization, which is procedure that allows us to look at these arteries, as well as place stents in them if they are significantly blocked. You received 1 stent to your Left Main artery, and 2 stents to your proximal/mid Left Anterior Descending artery. PLEASE CONTINUE TAKING ASPIRIN 81MG BY MOUTH DAILY AND BRILINTA 90MG BY MOUTH TWICE DAILY. DO NOT STOP THESE MEDICATIONS FOR ANY REASON AS THEY ARE KEEPING YOUR STENTS OPEN AND PREVENTING A HEART ATTACK. You had two of these catheterizatio procedures, and have two access sites, one in your right wrist, and another in your right groin. Avoid strenuous activity or heavy lifting for the next 5 days. Do not take a bath or swim for the next 5 days. You may show. For any bleeding or hematoma formation (hardened blood collection under the skin) at the access sites of your wrist or groin, please hold pressure and go to the nearest Emergency Department for evaluation.   You do have some remaining blockages in the coronary arteries. For this you will return for another catheterization with more stent placement in 4-6 weeks.      Please follow up with  ___________ within 1-2 weeks.         SECONDARY DISCHARGE DIAGNOSES  Diagnosis: HLD (hyperlipidemia)  Assessment and Plan of Treatment: Please continue Atorvastatin 80mg by mouth daily at bedtime to keep your cholesterol low. High cholesterol contributes to heart disease. PRINCIPAL DISCHARGE DIAGNOSIS  Diagnosis: NSTEMI (non-ST elevated myocardial infarction)  Assessment and Plan of Treatment: You were admitted to the hospital for a NSTEMI, which is a diagnosis that is included in Acute Coronary Syndrome. These means that you had blockages in the arteries that supply blood to your heart, which was causes your symptoms of chest pain to manifest. Because of this you underwent a cardiac catheterization, which is procedure that allows us to look at these arteries, as well as place stents in them if they are significantly blocked. You received 1 stent to your Left Main artery, and 2 stents to your proximal/mid Left Anterior Descending artery. PLEASE CONTINUE TAKING ASPIRIN 81MG BY MOUTH DAILY AND BRILINTA 90MG BY MOUTH TWICE DAILY. DO NOT STOP THESE MEDICATIONS FOR ANY REASON AS THEY ARE KEEPING YOUR STENTS OPEN AND PREVENTING A HEART ATTACK. You had two of these catheterization procedures, and have two access sites, one in your right wrist, and another in your right groin. Avoid strenuous activity or heavy lifting for the next 5 days. Do not take a bath or swim for the next 5 days. You may shower. For any bleeding or hematoma formation (hardened blood collection under the skin) at the access sites of your wrist or groin, please hold pressure and go to the nearest Emergency Department for evaluation.   You do have some remaining blockages in the coronary arteries. For this you will return for another catheterization with more stent placement in 4-6 weeks.      Please follow up with Dr. Guallpa within 1 week as well as Dr. Lewis in 2 weeks.      SECONDARY DISCHARGE DIAGNOSES  Diagnosis: HLD (hyperlipidemia)  Assessment and Plan of Treatment: Please continue Atorvastatin 80mg by mouth daily at bedtime to keep your cholesterol low. High cholesterol contributes to heart disease. PRINCIPAL DISCHARGE DIAGNOSIS  Diagnosis: NSTEMI (non-ST elevated myocardial infarction)  Assessment and Plan of Treatment: You were admitted to the hospital for a NSTEMI, which is a diagnosis that is included in Acute Coronary Syndrome. These means that you had blockages in the arteries that supply blood to your heart, which was causes your symptoms of chest pain to manifest. Because of this you underwent a cardiac catheterization, which is procedure that allows us to look at these arteries, as well as place stents in them if they are significantly blocked. You received 1 stent to your Left Main artery, and 2 stents to your proximal/mid Left Anterior Descending artery. PLEASE CONTINUE TAKING ASPIRIN 81MG BY MOUTH DAILY AND BRILINTA 90MG BY MOUTH TWICE DAILY. DO NOT STOP THESE MEDICATIONS FOR ANY REASON AS THEY ARE KEEPING YOUR STENTS OPEN AND PREVENTING A HEART ATTACK. You had two of these catheterization procedures, and have two access sites, one in your right wrist, and another in your right groin. Avoid strenuous activity or heavy lifting for the next 5 days. Do not take a bath or swim for the next 5 days. You may shower. For any bleeding or hematoma formation (hardened blood collection under the skin) at the access sites of your wrist or groin, please hold pressure and go to the nearest Emergency Department for evaluation.   You do have some remaining blockages in the coronary arteries. For this you will return for another catheterization with more stent placement in 4-6 weeks.      Please follow up with Dr. Guallpa on 6/4/20 at 1030AM, as well as Dr. Lewis in 2 weeks.      SECONDARY DISCHARGE DIAGNOSES  Diagnosis: HLD (hyperlipidemia)  Assessment and Plan of Treatment: Please continue Atorvastatin 80mg by mouth daily at bedtime to keep your cholesterol low. High cholesterol contributes to heart disease.

## 2020-05-28 NOTE — PROGRESS NOTE ADULT - PROBLEM SELECTOR PLAN 3
Platelets 380 on admission, today 398   -Continue to monitor daily CBCs      F: NS @ 75 cc/hr pre cath   E: K >4, Mag >2  N: NPO for cath, DASH/TLC  DVT PPX: Heparin gtt  Code: Full  Dispo: Pending staged PCI of LAD on 5/28/20

## 2020-05-28 NOTE — DISCHARGE NOTE PROVIDER - PROVIDER TOKENS
PROVIDER:[TOKEN:[[id number]],FOLLOWUP:[1 week]],PROVIDER:[TOKEN:[[id number]],FOLLOWUP:[2 weeks]] PROVIDER:[TOKEN:[[id number]],SCHEDULEDAPPT:[06/04/2020],SCHEDULEDAPPTTIME:[10:30 AM]],PROVIDER:[TOKEN:[[id number]],FOLLOWUP:[2 weeks]]

## 2020-05-29 ENCOUNTER — TRANSCRIPTION ENCOUNTER (OUTPATIENT)
Age: 50
End: 2020-05-29

## 2020-05-29 VITALS — TEMPERATURE: 98 F

## 2020-05-29 PROBLEM — D47.3 ESSENTIAL (HEMORRHAGIC) THROMBOCYTHEMIA: Chronic | Status: ACTIVE | Noted: 2020-05-26

## 2020-05-29 PROBLEM — E78.5 HYPERLIPIDEMIA, UNSPECIFIED: Chronic | Status: ACTIVE | Noted: 2020-05-26

## 2020-05-29 LAB
ANION GAP SERPL CALC-SCNC: 11 MMOL/L — SIGNIFICANT CHANGE UP (ref 5–17)
BUN SERPL-MCNC: 12 MG/DL — SIGNIFICANT CHANGE UP (ref 7–23)
CALCIUM SERPL-MCNC: 8.8 MG/DL — SIGNIFICANT CHANGE UP (ref 8.4–10.5)
CHLORIDE SERPL-SCNC: 109 MMOL/L — HIGH (ref 96–108)
CO2 SERPL-SCNC: 17 MMOL/L — LOW (ref 22–31)
CREAT SERPL-MCNC: 0.76 MG/DL — SIGNIFICANT CHANGE UP (ref 0.5–1.3)
GLUCOSE SERPL-MCNC: 129 MG/DL — HIGH (ref 70–99)
HCT VFR BLD CALC: 36.2 % — SIGNIFICANT CHANGE UP (ref 34.5–45)
HGB BLD-MCNC: 12 G/DL — SIGNIFICANT CHANGE UP (ref 11.5–15.5)
LIDOCAIN SERPL-MCNC: 234 NMOL/L — HIGH
MAGNESIUM SERPL-MCNC: 2 MG/DL — SIGNIFICANT CHANGE UP (ref 1.6–2.6)
MCHC RBC-ENTMCNC: 30.4 PG — SIGNIFICANT CHANGE UP (ref 27–34)
MCHC RBC-ENTMCNC: 33.1 GM/DL — SIGNIFICANT CHANGE UP (ref 32–36)
MCV RBC AUTO: 91.6 FL — SIGNIFICANT CHANGE UP (ref 80–100)
NRBC # BLD: 0 /100 WBCS — SIGNIFICANT CHANGE UP (ref 0–0)
PLATELET # BLD AUTO: 377 K/UL — SIGNIFICANT CHANGE UP (ref 150–400)
POTASSIUM SERPL-MCNC: 4.1 MMOL/L — SIGNIFICANT CHANGE UP (ref 3.5–5.3)
POTASSIUM SERPL-SCNC: 4.1 MMOL/L — SIGNIFICANT CHANGE UP (ref 3.5–5.3)
RBC # BLD: 3.95 M/UL — SIGNIFICANT CHANGE UP (ref 3.8–5.2)
RBC # FLD: 13.5 % — SIGNIFICANT CHANGE UP (ref 10.3–14.5)
SODIUM SERPL-SCNC: 137 MMOL/L — SIGNIFICANT CHANGE UP (ref 135–145)
WBC # BLD: 13.08 K/UL — HIGH (ref 3.8–10.5)
WBC # FLD AUTO: 13.08 K/UL — HIGH (ref 3.8–10.5)

## 2020-05-29 PROCEDURE — C1874: CPT

## 2020-05-29 PROCEDURE — 71045 X-RAY EXAM CHEST 1 VIEW: CPT

## 2020-05-29 PROCEDURE — 93306 TTE W/DOPPLER COMPLETE: CPT

## 2020-05-29 PROCEDURE — C1887: CPT

## 2020-05-29 PROCEDURE — 85610 PROTHROMBIN TIME: CPT

## 2020-05-29 PROCEDURE — 83735 ASSAY OF MAGNESIUM: CPT

## 2020-05-29 PROCEDURE — 36415 COLL VENOUS BLD VENIPUNCTURE: CPT

## 2020-05-29 PROCEDURE — C1894: CPT

## 2020-05-29 PROCEDURE — C1760: CPT

## 2020-05-29 PROCEDURE — 84702 CHORIONIC GONADOTROPIN TEST: CPT

## 2020-05-29 PROCEDURE — C1769: CPT

## 2020-05-29 PROCEDURE — 83695 ASSAY OF LIPOPROTEIN(A): CPT

## 2020-05-29 PROCEDURE — 99285 EMERGENCY DEPT VISIT HI MDM: CPT | Mod: 25

## 2020-05-29 PROCEDURE — 82248 BILIRUBIN DIRECT: CPT

## 2020-05-29 PROCEDURE — 83036 HEMOGLOBIN GLYCOSYLATED A1C: CPT

## 2020-05-29 PROCEDURE — U0003: CPT

## 2020-05-29 PROCEDURE — 96374 THER/PROPH/DIAG INJ IV PUSH: CPT

## 2020-05-29 PROCEDURE — 85730 THROMBOPLASTIN TIME PARTIAL: CPT

## 2020-05-29 PROCEDURE — 85027 COMPLETE CBC AUTOMATED: CPT

## 2020-05-29 PROCEDURE — C1725: CPT

## 2020-05-29 PROCEDURE — 84443 ASSAY THYROID STIM HORMONE: CPT

## 2020-05-29 PROCEDURE — 80061 LIPID PANEL: CPT

## 2020-05-29 PROCEDURE — 84484 ASSAY OF TROPONIN QUANT: CPT

## 2020-05-29 PROCEDURE — 81003 URINALYSIS AUTO W/O SCOPE: CPT

## 2020-05-29 PROCEDURE — 82553 CREATINE MB FRACTION: CPT

## 2020-05-29 PROCEDURE — 85025 COMPLETE CBC W/AUTO DIFF WBC: CPT

## 2020-05-29 PROCEDURE — C1753: CPT

## 2020-05-29 PROCEDURE — 93005 ELECTROCARDIOGRAM TRACING: CPT

## 2020-05-29 PROCEDURE — 99239 HOSP IP/OBS DSCHRG MGMT >30: CPT

## 2020-05-29 PROCEDURE — 80048 BASIC METABOLIC PNL TOTAL CA: CPT

## 2020-05-29 PROCEDURE — 87635 SARS-COV-2 COVID-19 AMP PRB: CPT

## 2020-05-29 PROCEDURE — 82550 ASSAY OF CK (CPK): CPT

## 2020-05-29 PROCEDURE — 80053 COMPREHEN METABOLIC PANEL: CPT

## 2020-05-29 RX ORDER — SODIUM CHLORIDE 9 MG/ML
500 INJECTION INTRAMUSCULAR; INTRAVENOUS; SUBCUTANEOUS ONCE
Refills: 0 | Status: COMPLETED | OUTPATIENT
Start: 2020-05-29 | End: 2020-05-29

## 2020-05-29 RX ORDER — TICAGRELOR 90 MG/1
1 TABLET ORAL
Qty: 60 | Refills: 0
Start: 2020-05-29 | End: 2020-06-27

## 2020-05-29 RX ORDER — ASPIRIN/CALCIUM CARB/MAGNESIUM 324 MG
1 TABLET ORAL
Qty: 30 | Refills: 11
Start: 2020-05-29 | End: 2021-05-23

## 2020-05-29 RX ORDER — METOPROLOL TARTRATE 50 MG
1 TABLET ORAL
Qty: 30 | Refills: 2
Start: 2020-05-29 | End: 2020-08-26

## 2020-05-29 RX ORDER — ACETAMINOPHEN 500 MG
650 TABLET ORAL ONCE
Refills: 0 | Status: COMPLETED | OUTPATIENT
Start: 2020-05-29 | End: 2020-05-29

## 2020-05-29 RX ORDER — TICAGRELOR 90 MG/1
1 TABLET ORAL
Qty: 60 | Refills: 11
Start: 2020-05-29 | End: 2021-05-23

## 2020-05-29 RX ORDER — ATORVASTATIN CALCIUM 80 MG/1
1 TABLET, FILM COATED ORAL
Qty: 30 | Refills: 2
Start: 2020-05-29 | End: 2020-08-26

## 2020-05-29 RX ADMIN — Medication 81 MILLIGRAM(S): at 14:22

## 2020-05-29 RX ADMIN — SODIUM CHLORIDE 500 MILLILITER(S): 9 INJECTION INTRAMUSCULAR; INTRAVENOUS; SUBCUTANEOUS at 10:18

## 2020-05-29 RX ADMIN — Medication 25 MILLIGRAM(S): at 06:23

## 2020-05-29 RX ADMIN — TICAGRELOR 90 MILLIGRAM(S): 90 TABLET ORAL at 06:23

## 2020-05-29 RX ADMIN — Medication 650 MILLIGRAM(S): at 11:18

## 2020-05-29 NOTE — PROVIDER CONTACT NOTE (OTHER) - ACTION/TREATMENT ORDERED:
RADAMES Rowley made aware. No interventions ordered at this time. Will continue to monitor. Pt. remains asymptomatic.

## 2020-05-29 NOTE — PROVIDER CONTACT NOTE (OTHER) - ASSESSMENT
Pt. A&Ox4, no distress noted. Pt. denies chest pain, SOB, dizziness and headache once back in bed. Repeat VSS. R groin remains c/d/i, free of bleeding and hematoma. R radial site remains free of bleeding and hematoma, still remains puffy and swollen, no increase overnight.

## 2020-05-29 NOTE — PROVIDER CONTACT NOTE (OTHER) - REASON
Pt. s/p cath and remained in bed overnight. Pt. sat at bedside and felt slightly dizzy, BP 97/55 HR 65. Pt. did not feel dizzy after lying in bed.

## 2020-05-29 NOTE — DISCHARGE NOTE NURSING/CASE MANAGEMENT/SOCIAL WORK - PATIENT PORTAL LINK FT
You can access the FollowMyHealth Patient Portal offered by [facility] by registering at the following website: [email or web address]. By joining [facility]’s FollowMyHealth portal, you will also be able to view your health information using other applications (apps) compatible with our system.

## 2020-06-02 VITALS
RESPIRATION RATE: 15 BRPM | HEIGHT: 65 IN | HEART RATE: 61 BPM | SYSTOLIC BLOOD PRESSURE: 108 MMHG | TEMPERATURE: 99 F | OXYGEN SATURATION: 97 % | WEIGHT: 164.91 LBS | DIASTOLIC BLOOD PRESSURE: 56 MMHG

## 2020-06-02 NOTE — H&P ADULT - ASSESSMENT
Patient COVID negative test results on [facility] 06/03/2020    50 y/o Female with FHX CAD (Mother X2 3VCABG at age 46) and PMHx of Hyperlipidemia, Essential Thrombocytosis, CAD s/p NSTEMI 05/27/2020 s/p dx cath on 5/27/2020 showing 2VCAD, s/p cardiac cath on 05/28/2020 with DANYELLE LM, DANYELLE x 2 p/m LAD with residual RCA  with initial plan of staged PCI of RCA in 6 weeks contacted Dr Vj Lewis who presents today for staged PCI.     ASA II, Mallampati II    Hgb/HCT 12.5/38.4. Pt reports compliance with Aspirin 81mg PO daily and Brilinta 90mg PO BID. Pt reports she took Aspirin 81mg and Brilinta 90mg this morning, no further load given.   NS @ 75cc/hr given prior to cath. EF 50% by cath. Cr 0.93. Euvolemic on exam.     Risks & benefits of procedure and alternative therapy have been explained to the patient including but not limited to: allergic reaction, bleeding w/possible need for blood transfusion, infection, renal and vascular compromise, limb damage, arrhythmia, stroke, vessel dissection/perforation, Myocardial infarction, emergent CABG. Informed consent obtained and in chart. Patient COVID negative test results on [facility] 06/03/2020    48 y/o Female with FHX CAD (Mother X2 3VCABG at age 46) and PMHx of Hyperlipidemia, Essential Thrombocytosis, CAD s/p NSTEMI 05/27/2020 s/p dx cath on 5/27/2020 showing 2VCAD, s/p cardiac cath on 05/28/2020 with DANYELLE LM, DANYELLE x 2 p/m LAD with residual RCA  with initial plan of staged PCI of RCA in 6 weeks contacted Dr Vj Lewis who presents today for staged PCI.     ASA II, Mallampati II    Hgb/HCT 12.5/38.4. Pt reports compliance with Aspirin 81mg PO daily and Brilinta 90mg PO BID. Pt reports she took Aspirin 81mg and Brilinta 90mg this morning, no further load given.   NS @ 75cc/hr given prior to cath. EF 50% by cath. Cr 0.93. Euvolemic on exam.   Beta HCg negative     Risks & benefits of procedure and alternative therapy have been explained to the patient including but not limited to: allergic reaction, bleeding w/possible need for blood transfusion, infection, renal and vascular compromise, limb damage, arrhythmia, stroke, vessel dissection/perforation, Myocardial infarction, emergent CABG. Informed consent obtained and in chart. Patient COVID negative test results on [facility] 06/03/2020    48 y/o Female with FHX CAD (Mother X2 3VCABG at age 46) and PMHx of Hyperlipidemia, Essential Thrombocytosis, CAD s/p NSTEMI 05/27/2020 s/p dx cath on 5/27/2020 showing 2VCAD, s/p cardiac cath on 05/28/2020 with DANYELLE LM, DANYELLE x 2 p/m LAD with residual RCA  with initial plan of staged PCI of RCA in 6 weeks contacted Dr Vj Lewis who presents today for staged PCI.     ASA II, Mallampati II    Hgb/HCT 12.5/38.4, Plt 484. Pt reports compliance with Aspirin 81mg PO daily and Brilinta 90mg PO BID. Pt reports she took Aspirin 81mg and Brilinta 90mg this morning, no further load given.   NS @ 75cc/hr given prior to cath. EF 50% by cath. Cr 0.93. Euvolemic on exam.   Beta HCg negative     Risks & benefits of procedure and alternative therapy have been explained to the patient including but not limited to: allergic reaction, bleeding w/possible need for blood transfusion, infection, renal and vascular compromise, limb damage, arrhythmia, stroke, vessel dissection/perforation, Myocardial infarction, emergent CABG. Informed consent obtained and in chart.

## 2020-06-02 NOTE — H&P ADULT - HISTORY OF PRESENT ILLNESS
COVID test planned for 06/03 in [place]    48 y/o Female with FHX CAD (Mother X2 3VCABG at age 46) and PMHx of Hyperlipidemia, Essential Thrombocytosis, CAD s/p NSTEMI 05/27/2020 s/p dx cath on 5/27/2020 showing 2VCAD, s/p cardiac cath on 05/28/2020 with DANYELLE LM, DANYELLE x 2 p/m LAD with residual RCA  with initial plan of staged PCI of RCA in 6 weeks contacted Dr Vj Lewis endorsing worsening chest discomfort with exertion of one block. Denies SOB, dizziness, palpitations, nausea, orthopnea, LE edema, claudication. These symptoms are similar how she felt prior to her MI, but now her symptoms are milder. Echo from 05/27/2020 normal EF, no valve disease.     In light of pt's risk factors, CCS Class 3 symptoms, and known RCA  pt presents for recommended cardiac catheterization with staged PCI.     Cardiac Cath History:     Diagnostic Cardiac cath 05/27/2020: revealing 2V CAD w/  mRCA, ostial LM 30-50%, pLAD 90% at bifurcation, ostial D1 90%, tortuous LCx w/ luminal irregularities w/ L-R collaterals from LCx and LAD; preserved EF 50%, EDP 5, access R radial.    Cardiac Cath 05/28/2020: DANYELLE x1 LM and DANYELLE x2 to p/mLAD; residual mRCA  w/ L-R collaterals; LCx mild luminal irregularities; R radial and R groin (perclose) access sites. Further plan for pt to return in 6 weeks for staged PCI of RCA . Patient COVID negative test results on [facility] 06/03/2020    50 y/o Female with FHX CAD (Mother X2 3VCABG at age 46) and PMHx of Hyperlipidemia, Essential Thrombocytosis, CAD s/p NSTEMI 05/27/2020 s/p dx cath on 5/27/2020 showing 2VCAD, s/p cardiac cath on 05/28/2020 with DANYELLE LM, DANYELLE x 2 p/m LAD with residual RCA  with initial plan of staged PCI of RCA in 6 weeks contacted Dr Vj Lewis endorsing worsening chest discomfort with exertion of one block. Denies SOB, dizziness, palpitations, nausea, orthopnea, LE edema, claudication. These symptoms are similar how she felt prior to her MI, but now her symptoms are milder. Echo from 05/27/2020 normal EF, no valve disease.     In light of pt's risk factors, CCS Class 3 symptoms, and known RCA  pt presents for recommended cardiac catheterization with staged PCI.     Cardiac Cath History:     Diagnostic Cardiac cath 05/27/2020: revealing 2V CAD w/  mRCA, ostial LM 30-50%, pLAD 90% at bifurcation, ostial D1 90%, tortuous LCx w/ luminal irregularities w/ L-R collaterals from LCx and LAD; preserved EF 50%, EDP 5, access R radial.    Cardiac Cath 05/28/2020: DANYELLE x1 LM and DANYELLE x2 to p/mLAD; residual mRCA  w/ L-R collaterals; LCx mild luminal irregularities; R radial and R groin (perclose) access sites. Further plan for pt to return in 6 weeks for staged PCI of RCA .

## 2020-06-02 NOTE — H&P ADULT - NSHPLABSRESULTS_GEN_ALL_CORE
ECG: Sinus bradycardia @ 56bpm, Q in II, III, aVF, V4, V5, V6                          12.5   8.30  )-----------( 484      ( 05 Jun 2020 09:57 )             38.4       06-05    140  |  106  |  16  ----------------------------<  98  4.0   |  22  |  0.93    Ca    9.4      05 Jun 2020 09:57    TPro  7.5  /  Alb  4.5  /  TBili  0.5  /  DBili  x   /  AST  14  /  ALT  13  /  AlkPhos  56  06-05      PT/INR - ( 05 Jun 2020 09:57 )   PT: 13.3 sec;   INR: 1.16          PTT - ( 05 Jun 2020 09:57 )  PTT:31.5 sec    CARDIAC MARKERS ( 05 Jun 2020 09:57 )  x     / x     / 97 U/L / x     / 1.7 ng/mL

## 2020-06-03 ENCOUNTER — APPOINTMENT (OUTPATIENT)
Dept: DISASTER EMERGENCY | Facility: CLINIC | Age: 50
End: 2020-06-03

## 2020-06-03 DIAGNOSIS — I21.4 NON-ST ELEVATION (NSTEMI) MYOCARDIAL INFARCTION: ICD-10-CM

## 2020-06-03 DIAGNOSIS — E78.5 HYPERLIPIDEMIA, UNSPECIFIED: ICD-10-CM

## 2020-06-03 DIAGNOSIS — D47.3 ESSENTIAL (HEMORRHAGIC) THROMBOCYTHEMIA: ICD-10-CM

## 2020-06-03 DIAGNOSIS — Z01.818 ENCOUNTER FOR OTHER PREPROCEDURAL EXAMINATION: ICD-10-CM

## 2020-06-03 DIAGNOSIS — R07.9 CHEST PAIN, UNSPECIFIED: ICD-10-CM

## 2020-06-03 DIAGNOSIS — Z82.49 FAMILY HISTORY OF ISCHEMIC HEART DISEASE AND OTHER DISEASES OF THE CIRCULATORY SYSTEM: ICD-10-CM

## 2020-06-04 ENCOUNTER — NON-APPOINTMENT (OUTPATIENT)
Age: 50
End: 2020-06-04

## 2020-06-04 ENCOUNTER — APPOINTMENT (OUTPATIENT)
Dept: HEART AND VASCULAR | Facility: CLINIC | Age: 50
End: 2020-06-04
Payer: COMMERCIAL

## 2020-06-04 VITALS
HEIGHT: 65 IN | HEART RATE: 68 BPM | SYSTOLIC BLOOD PRESSURE: 110 MMHG | OXYGEN SATURATION: 98 % | BODY MASS INDEX: 27.49 KG/M2 | DIASTOLIC BLOOD PRESSURE: 72 MMHG | WEIGHT: 165 LBS

## 2020-06-04 VITALS — TEMPERATURE: 99.2 F

## 2020-06-04 DIAGNOSIS — Z78.9 OTHER SPECIFIED HEALTH STATUS: ICD-10-CM

## 2020-06-04 DIAGNOSIS — Z82.49 FAMILY HISTORY OF ISCHEMIC HEART DISEASE AND OTHER DISEASES OF THE CIRCULATORY SYSTEM: ICD-10-CM

## 2020-06-04 LAB — SARS-COV-2 N GENE NPH QL NAA+PROBE: NOT DETECTED

## 2020-06-04 PROCEDURE — 99215 OFFICE O/P EST HI 40 MIN: CPT

## 2020-06-04 NOTE — HISTORY OF PRESENT ILLNESS
[FreeTextEntry1] : Patient is a 50yo F with PMHx of CAD diagnosed on 5/2020 s/p NSTEMI and PCI to pLAD and mLAD pending fixing RCA , elevated Lp(a), and essential thrombocytosis who comes to clinic to establish care. \par \par She was met in the hospital last week where she was enrolled in the [facility] Heart Study after she presented with CP and found to have an NSTEMI and had intervention. Since then, she was still experiencing chest tightness on minimal exertion which is likely due to  which was supposed to be fixed in a couple of weeks, but after discussing her symptoms with Dr. Lewis a couple of days ago they decided to move her next intervention to tomorrow. \par \par Last night she R arm pain since last night which was mild and has resolved by now. Also has noticed a R groin lump since yesterday. Of note, her initial cath was done through the R wrist and the next day when she has her LAD fixed she had both radial and femoral access. \par \par *  Labs on 5/28 (off of meds)\par - \par - \par - HDL 56\par - HgA1C 5.5%\par - Lp(a) 234\par \par * Cath 5/28:\par - LMCA- 50-60% ostial stenosis (pressure damping and ventricularization with\par guide engagement)\par - LAD- prox 90% stenosis, mid 80% stenosis\par - LCx- luminal irregularities\par - RCA- mid RCA short  ( 18-20mm) with L--> R collaterals (rentrop grade 3), JCTO score 0\par ** Intervention\par - Successful PCI to mid LAD with 2.75 x 38mm promus DANYELLE with excellent result, KALANI 3 flow\par - Successful PCI to prox LAD with 3 x16 mm promus DANYELLE with excellent result, KALANI 3 flow\par - Successful IVUS guided PCI to left main ostium with 4.0 x 8mm promus DANYELLE with excellent result, KALANI 3 flow.\par \par * Echo on 5/28:\par Left Ventricle:\par The left ventricle is normal in size, wall thickness, and systolic function with a calculated ejection fraction of 63%. There is normal left ventricular diastolic function and filling pressure.\par  \par Right Ventricle:\par The right ventricle is normal in size. Right ventricular systolic function is normal. The tricuspid annular plane systolic excursion (TAPSE) is 22.00 mm (normal >=17 mm). RV tissue Doppler S' is 10.60 cm/s (normal >10 cm/s).\par  \par Left Atrium:\par The left atrium is normal in size. Left atrial volume index (SHELLY) is 22.6 ml/m?.\par  \par Right Atrium:\par The right atrium is normal in size.\par  \par Aortic Valve:\par The aortic valve is tricuspid with normal structure and function without stenosis. There is no evidence of aortic regurgitation.\par  \par Mitral Valve:\par Structurally normal mitral valve with normal leaflet excursion. There is trace mitral regurgitation.\par  \par Tricuspid Valve:\par Structurally normal tricuspid valve with normal leaflet excursion. There is trace tricuspid regurgitation. There was insufficient tricuspid regurgitation detected from which to calculate pulmonary artery systolic pressure.\par  \par Inferior Vena Cava:\par The inferior vena cava is normal in size (<2.1cm) with normal inspiratory collapse (>50%) consistent with normal right atrial pressure (\par 3, range 0-5mmHg).\par  \par Pulmonic Valve:\par Structurally normal pulmonic valve with normal leaflet excursion.\par  \par Aorta:\par The aortic root is normal in size.\par  \par Pericardium:\par Trivial pericardial effusion.\par

## 2020-06-04 NOTE — DISCUSSION/SUMMARY
[FreeTextEntry1] : Patient is a 48yo F with PMHx of CAD diagnosed on 5/2020 s/p NSTEMI and PCI to pLAD and mLAD pending fixing RCA , elevated Lp(a), and essential thrombocytosis who comes to clinic to establish care. \par \par # CAD\par - Patient will have cath tomorrow to fix  of RCA\par - Continue current statin regimen and will reassess lipids in 2 months\par - Given FH and patient's personal history and elevated Lp(a), patient referred for genetic testing\par - Continue current antiplatelet therapy \par \par # ASCVD risk reduction\par - Patient enrolled in Young Heart Study\par - She will have appointment with Swati for ASCVD risk reduction next week

## 2020-06-04 NOTE — PHYSICAL EXAM
[Normal Appearance] : normal appearance [General Appearance - Well Developed] : well developed [Well Groomed] : well groomed [General Appearance - Well Nourished] : well nourished [No Deformities] : no deformities [General Appearance - In No Acute Distress] : no acute distress [Normal Conjunctiva] : the conjunctiva exhibited no abnormalities [Eyelids - No Xanthelasma] : the eyelids demonstrated no xanthelasmas [Normal Oral Mucosa] : normal oral mucosa [No Oral Pallor] : no oral pallor [No Oral Cyanosis] : no oral cyanosis [Normal Jugular Venous A Waves Present] : normal jugular venous A waves present [Normal Jugular Venous V Waves Present] : normal jugular venous V waves present [No Jugular Venous Mckenzie A Waves] : no jugular venous mckenzie A waves [Heart Rate And Rhythm] : heart rate and rhythm were normal [Heart Sounds] : normal S1 and S2 [Murmurs] : no murmurs present [Respiration, Rhythm And Depth] : normal respiratory rhythm and effort [Exaggerated Use Of Accessory Muscles For Inspiration] : no accessory muscle use [Auscultation Breath Sounds / Voice Sounds] : lungs were clear to auscultation bilaterally [Abdomen Tenderness] : non-tender [Abdomen Soft] : soft [Abdomen Mass (___ Cm)] : no abdominal mass palpated [Abnormal Walk] : normal gait [Gait - Sufficient For Exercise Testing] : the gait was sufficient for exercise testing [Nail Clubbing] : no clubbing of the fingernails [Cyanosis, Localized] : no localized cyanosis [Petechial Hemorrhages (___cm)] : no petechial hemorrhages [Skin Color & Pigmentation] : normal skin color and pigmentation [] : no rash [No Venous Stasis] : no venous stasis [Skin Lesions] : no skin lesions [No Skin Ulcers] : no skin ulcer [No Xanthoma] : no  xanthoma was observed [Affect] : the affect was normal [Oriented To Time, Place, And Person] : oriented to person, place, and time [Mood] : the mood was normal [No Anxiety] : not feeling anxious [FreeTextEntry1] : No arcus

## 2020-06-04 NOTE — REASON FOR VISIT
[Consultation] : a consultation regarding [Coronary Artery Disease] : coronary artery disease [FreeTextEntry1] : Patient is a 48yo F with PMHx of CAD diagnosed on 5/2020 s/p NSTEMI and PCI to pLAD and mLAD pending fixing RCA , elevated Lp(a), and essential thrombocytosis who comes to clinic to establish care.

## 2020-06-05 ENCOUNTER — TRANSCRIPTION ENCOUNTER (OUTPATIENT)
Age: 50
End: 2020-06-05

## 2020-06-05 ENCOUNTER — INPATIENT (INPATIENT)
Facility: HOSPITAL | Age: 50
LOS: 0 days | Discharge: HOME | DRG: 246 | End: 2020-06-06
Attending: INTERNAL MEDICINE | Admitting: INTERNAL MEDICINE
Payer: COMMERCIAL

## 2020-06-05 LAB
A1C WITH ESTIMATED AVERAGE GLUCOSE RESULT: 5.5 % — SIGNIFICANT CHANGE UP (ref 4–5.6)
ALBUMIN SERPL ELPH-MCNC: 4.5 G/DL — SIGNIFICANT CHANGE UP (ref 3.3–5)
ALP SERPL-CCNC: 56 U/L — SIGNIFICANT CHANGE UP (ref 40–120)
ALT FLD-CCNC: 13 U/L — SIGNIFICANT CHANGE UP (ref 10–45)
ANION GAP SERPL CALC-SCNC: 12 MMOL/L — SIGNIFICANT CHANGE UP (ref 5–17)
APTT BLD: 31.5 SEC — SIGNIFICANT CHANGE UP (ref 27.5–36.3)
AST SERPL-CCNC: 14 U/L — SIGNIFICANT CHANGE UP (ref 10–40)
BASOPHILS # BLD AUTO: 0.04 K/UL — SIGNIFICANT CHANGE UP (ref 0–0.2)
BASOPHILS NFR BLD AUTO: 0.5 % — SIGNIFICANT CHANGE UP (ref 0–2)
BILIRUB SERPL-MCNC: 0.5 MG/DL — SIGNIFICANT CHANGE UP (ref 0.2–1.2)
BLD GP AB SCN SERPL QL: NEGATIVE — SIGNIFICANT CHANGE UP
BUN SERPL-MCNC: 16 MG/DL — SIGNIFICANT CHANGE UP (ref 7–23)
CALCIUM SERPL-MCNC: 9.4 MG/DL — SIGNIFICANT CHANGE UP (ref 8.4–10.5)
CHLORIDE SERPL-SCNC: 106 MMOL/L — SIGNIFICANT CHANGE UP (ref 96–108)
CHOLEST SERPL-MCNC: 172 MG/DL — SIGNIFICANT CHANGE UP (ref 10–199)
CK MB CFR SERPL CALC: 1.7 NG/ML — SIGNIFICANT CHANGE UP (ref 0–6.7)
CK SERPL-CCNC: 97 U/L — SIGNIFICANT CHANGE UP (ref 25–170)
CO2 SERPL-SCNC: 22 MMOL/L — SIGNIFICANT CHANGE UP (ref 22–31)
CREAT SERPL-MCNC: 0.93 MG/DL — SIGNIFICANT CHANGE UP (ref 0.5–1.3)
EOSINOPHIL # BLD AUTO: 0.22 K/UL — SIGNIFICANT CHANGE UP (ref 0–0.5)
EOSINOPHIL NFR BLD AUTO: 2.7 % — SIGNIFICANT CHANGE UP (ref 0–6)
ESTIMATED AVERAGE GLUCOSE: 111 MG/DL — SIGNIFICANT CHANGE UP (ref 68–114)
GLUCOSE SERPL-MCNC: 98 MG/DL — SIGNIFICANT CHANGE UP (ref 70–99)
HCG SERPL-ACNC: <0 MIU/ML — SIGNIFICANT CHANGE UP
HCT VFR BLD CALC: 30.1 % — LOW (ref 34.5–45)
HCT VFR BLD CALC: 38.4 % — SIGNIFICANT CHANGE UP (ref 34.5–45)
HDLC SERPL-MCNC: 46 MG/DL — LOW
HGB BLD-MCNC: 12.5 G/DL — SIGNIFICANT CHANGE UP (ref 11.5–15.5)
HGB BLD-MCNC: 9.8 G/DL — LOW (ref 11.5–15.5)
IMM GRANULOCYTES NFR BLD AUTO: 0.4 % — SIGNIFICANT CHANGE UP (ref 0–1.5)
INR BLD: 1.16 — SIGNIFICANT CHANGE UP (ref 0.88–1.16)
LIPID PNL WITH DIRECT LDL SERPL: 110 MG/DL — HIGH
LYMPHOCYTES # BLD AUTO: 1.84 K/UL — SIGNIFICANT CHANGE UP (ref 1–3.3)
LYMPHOCYTES # BLD AUTO: 22.2 % — SIGNIFICANT CHANGE UP (ref 13–44)
MCHC RBC-ENTMCNC: 30.1 PG — SIGNIFICANT CHANGE UP (ref 27–34)
MCHC RBC-ENTMCNC: 30.4 PG — SIGNIFICANT CHANGE UP (ref 27–34)
MCHC RBC-ENTMCNC: 32.6 GM/DL — SIGNIFICANT CHANGE UP (ref 32–36)
MCHC RBC-ENTMCNC: 32.6 GM/DL — SIGNIFICANT CHANGE UP (ref 32–36)
MCV RBC AUTO: 92.5 FL — SIGNIFICANT CHANGE UP (ref 80–100)
MCV RBC AUTO: 93.5 FL — SIGNIFICANT CHANGE UP (ref 80–100)
MONOCYTES # BLD AUTO: 0.53 K/UL — SIGNIFICANT CHANGE UP (ref 0–0.9)
MONOCYTES NFR BLD AUTO: 6.4 % — SIGNIFICANT CHANGE UP (ref 2–14)
NEUTROPHILS # BLD AUTO: 5.64 K/UL — SIGNIFICANT CHANGE UP (ref 1.8–7.4)
NEUTROPHILS NFR BLD AUTO: 67.8 % — SIGNIFICANT CHANGE UP (ref 43–77)
NRBC # BLD: 0 /100 WBCS — SIGNIFICANT CHANGE UP (ref 0–0)
NRBC # BLD: 0 /100 WBCS — SIGNIFICANT CHANGE UP (ref 0–0)
PLATELET # BLD AUTO: 367 K/UL — SIGNIFICANT CHANGE UP (ref 150–400)
PLATELET # BLD AUTO: 484 K/UL — HIGH (ref 150–400)
POTASSIUM SERPL-MCNC: 4 MMOL/L — SIGNIFICANT CHANGE UP (ref 3.5–5.3)
POTASSIUM SERPL-SCNC: 4 MMOL/L — SIGNIFICANT CHANGE UP (ref 3.5–5.3)
PROT SERPL-MCNC: 7.5 G/DL — SIGNIFICANT CHANGE UP (ref 6–8.3)
PROTHROM AB SERPL-ACNC: 13.3 SEC — HIGH (ref 10–12.9)
RBC # BLD: 3.22 M/UL — LOW (ref 3.8–5.2)
RBC # BLD: 4.15 M/UL — SIGNIFICANT CHANGE UP (ref 3.8–5.2)
RBC # FLD: 13.2 % — SIGNIFICANT CHANGE UP (ref 10.3–14.5)
RBC # FLD: 13.2 % — SIGNIFICANT CHANGE UP (ref 10.3–14.5)
RH IG SCN BLD-IMP: POSITIVE — SIGNIFICANT CHANGE UP
SODIUM SERPL-SCNC: 140 MMOL/L — SIGNIFICANT CHANGE UP (ref 135–145)
TOTAL CHOLESTEROL/HDL RATIO MEASUREMENT: 3.7 RATIO — SIGNIFICANT CHANGE UP (ref 3.3–7.1)
TRIGL SERPL-MCNC: 82 MG/DL — SIGNIFICANT CHANGE UP (ref 10–149)
WBC # BLD: 8.3 K/UL — SIGNIFICANT CHANGE UP (ref 3.8–10.5)
WBC # BLD: 9.19 K/UL — SIGNIFICANT CHANGE UP (ref 3.8–10.5)
WBC # FLD AUTO: 8.3 K/UL — SIGNIFICANT CHANGE UP (ref 3.8–10.5)
WBC # FLD AUTO: 9.19 K/UL — SIGNIFICANT CHANGE UP (ref 3.8–10.5)

## 2020-06-05 PROCEDURE — 93454 CORONARY ARTERY ANGIO S&I: CPT | Mod: 26,59

## 2020-06-05 PROCEDURE — 92978 ENDOLUMINL IVUS OCT C 1ST: CPT | Mod: 26,RC

## 2020-06-05 PROCEDURE — 92928 PRQ TCAT PLMT NTRAC ST 1 LES: CPT | Mod: RC

## 2020-06-05 RX ORDER — ATORVASTATIN CALCIUM 80 MG/1
80 TABLET, FILM COATED ORAL AT BEDTIME
Refills: 0 | Status: DISCONTINUED | OUTPATIENT
Start: 2020-06-05 | End: 2020-06-06

## 2020-06-05 RX ORDER — SODIUM CHLORIDE 9 MG/ML
1000 INJECTION INTRAMUSCULAR; INTRAVENOUS; SUBCUTANEOUS
Refills: 0 | Status: DISCONTINUED | OUTPATIENT
Start: 2020-06-05 | End: 2020-06-06

## 2020-06-05 RX ORDER — CHLORHEXIDINE GLUCONATE 213 G/1000ML
1 SOLUTION TOPICAL ONCE
Refills: 0 | Status: DISCONTINUED | OUTPATIENT
Start: 2020-06-05 | End: 2020-06-05

## 2020-06-05 RX ORDER — SODIUM CHLORIDE 9 MG/ML
500 INJECTION INTRAMUSCULAR; INTRAVENOUS; SUBCUTANEOUS
Refills: 0 | Status: DISCONTINUED | OUTPATIENT
Start: 2020-06-05 | End: 2020-06-05

## 2020-06-05 RX ORDER — SODIUM CHLORIDE 9 MG/ML
500 INJECTION INTRAMUSCULAR; INTRAVENOUS; SUBCUTANEOUS ONCE
Refills: 0 | Status: COMPLETED | OUTPATIENT
Start: 2020-06-05 | End: 2020-06-05

## 2020-06-05 RX ORDER — ASPIRIN/CALCIUM CARB/MAGNESIUM 324 MG
81 TABLET ORAL DAILY
Refills: 0 | Status: DISCONTINUED | OUTPATIENT
Start: 2020-06-06 | End: 2020-06-06

## 2020-06-05 RX ORDER — ACETAMINOPHEN 500 MG
650 TABLET ORAL EVERY 6 HOURS
Refills: 0 | Status: DISCONTINUED | OUTPATIENT
Start: 2020-06-05 | End: 2020-06-06

## 2020-06-05 RX ORDER — TICAGRELOR 90 MG/1
90 TABLET ORAL
Refills: 0 | Status: DISCONTINUED | OUTPATIENT
Start: 2020-06-05 | End: 2020-06-06

## 2020-06-05 RX ORDER — ACETAMINOPHEN 500 MG
650 TABLET ORAL ONCE
Refills: 0 | Status: COMPLETED | OUTPATIENT
Start: 2020-06-05 | End: 2020-06-05

## 2020-06-05 RX ORDER — METOPROLOL TARTRATE 50 MG
25 TABLET ORAL DAILY
Refills: 0 | Status: DISCONTINUED | OUTPATIENT
Start: 2020-06-06 | End: 2020-06-06

## 2020-06-05 RX ADMIN — Medication 650 MILLIGRAM(S): at 23:09

## 2020-06-05 RX ADMIN — Medication 650 MILLIGRAM(S): at 16:20

## 2020-06-05 RX ADMIN — SODIUM CHLORIDE 1000 MILLILITER(S): 9 INJECTION INTRAMUSCULAR; INTRAVENOUS; SUBCUTANEOUS at 21:00

## 2020-06-05 RX ADMIN — SODIUM CHLORIDE 1000 MILLILITER(S): 9 INJECTION INTRAMUSCULAR; INTRAVENOUS; SUBCUTANEOUS at 21:55

## 2020-06-05 RX ADMIN — SODIUM CHLORIDE 125 MILLILITER(S): 9 INJECTION INTRAMUSCULAR; INTRAVENOUS; SUBCUTANEOUS at 23:33

## 2020-06-05 RX ADMIN — ATORVASTATIN CALCIUM 80 MILLIGRAM(S): 80 TABLET, FILM COATED ORAL at 22:33

## 2020-06-05 RX ADMIN — SODIUM CHLORIDE 75 MILLILITER(S): 9 INJECTION INTRAMUSCULAR; INTRAVENOUS; SUBCUTANEOUS at 15:07

## 2020-06-05 RX ADMIN — TICAGRELOR 90 MILLIGRAM(S): 90 TABLET ORAL at 18:46

## 2020-06-05 NOTE — DISCHARGE NOTE PROVIDER - NSDCFUADDINST_GEN_ALL_CORE_FT
You underwent a coronary angiogram and should wait 3 days before returning to ordinary activities. Do not drive for 2 days. Consult your doctor before returning to vigorous activity. You may return to work in 3-5 days. The catheter from your groin was removed and you should remove the dressing in 24 hours. You may shower once the dressing is removed, but avoid baths, hot tubs, or swimming for 5 days to prevent infection. If you notice bleeding from the site, hardening and pain at the site, drainage or redness from the site, coolness/paleness of the extremity, swelling, or fever, please call [phone]. Please continue your aspirin and Brilinta as prescribed unless otherwise indicated by your cardiologist. All of your prescriptions have been sent to the pharmacy. Please follow up with Dr. Lewis in 1-2 weeks. All of your needed prescriptions have been sent

## 2020-06-05 NOTE — DISCHARGE NOTE PROVIDER - NSDCCPCAREPLAN_GEN_ALL_CORE_FT
PRINCIPAL DISCHARGE DIAGNOSIS  Diagnosis: Coronary artery disease  Assessment and Plan of Treatment: You have blockages in the blood vessels that give blood and oxygen to your heart. This is called CORONARY ARTERY DISEASE. You had a procedure called a CARDIAC CATHETERIZATION and received two drug eluting stents to your proximal and mid RIGHT CORONARY ARTERY. Your stents which were place in 5/2020 had good blood flow through them. It is VERY IMPORTANT that you take ASPIRIN 81mg daily and BRILINTA (TICAGRELOR) 90MG TWICE A DAY to keep your stents open and avoid blood clots forming in your stents that could give you a heart attack. RIGHT AND LEFT GROIN WOUND CARE: do not lift anything heavier than 5 pounds for 5 days. Observe for signs of bleeding including bleeding/sudden swelling to your right or left groin sites, new/worsening back pain, or numbness/tingling/pain/coolness to your right or left legs. If you experience these symptoms call your doctor and go to the nearest emergency department immediately. Follow up with your cardiologist, Dr Blair.. in 1-2 weeks.      SECONDARY DISCHARGE DIAGNOSES  Diagnosis: Hyperlipidemia  Assessment and Plan of Treatment: Continue taking ATORVASTATIN (LIPITOR) 80mg daily at bedtime for cholesterol control. PRINCIPAL DISCHARGE DIAGNOSIS  Diagnosis: Coronary artery disease  Assessment and Plan of Treatment: You have blockages in the blood vessels that give blood and oxygen to your heart. This is called CORONARY ARTERY DISEASE. You had a procedure called a CARDIAC CATHETERIZATION and received two drug eluting stents to your proximal and mid RIGHT CORONARY ARTERY. Your stents which were place in 5/2020 had good blood flow through them. It is VERY IMPORTANT that you take ASPIRIN 81mg daily and BRILINTA (TICAGRELOR) 90MG TWICE A DAY to keep your stents open and avoid blood clots forming in your stents that could give you a heart attack. RIGHT AND LEFT GROIN WOUND CARE: do not lift anything heavier than 5 pounds for 5 days. Observe for signs of bleeding including bleeding/sudden swelling to your right or left groin sites, new/worsening back pain, or numbness/tingling/pain/coolness to your right or left legs. If you experience these symptoms call your doctor and go to the nearest emergency department immediately. Follow up with your cardiologist, Dr Lewis in 1-2 weeks.  Your metoprolol was discontinued due to continuous low blood pressure during your admission. Please discuss with your outpatient cardiologist if would like you to resume this medication      SECONDARY DISCHARGE DIAGNOSES  Diagnosis: Hyperlipidemia  Assessment and Plan of Treatment: Continue taking ATORVASTATIN (LIPITOR) 80mg daily at bedtime for cholesterol control.

## 2020-06-05 NOTE — DISCHARGE NOTE PROVIDER - NSDCFUSCHEDAPPT_GEN_ALL_CORE_FT
AGUILA CARLOS ; 06/12/2020 ; [facility] [address] AGUIAL CARLOS ; 06/12/2020 ; [facility] [address]

## 2020-06-05 NOTE — DISCHARGE NOTE PROVIDER - NSDCQMSTATINC_CARD_A_CORE
CERTIFICATE OF WORK      October 23, 2019      Re: Alex Loredo Sr.  [address] [facility] [address]      This is to certify that Alex Loredo Sr. was seen for an appointment today, 10/23/19.                  SIGNATURE:___________________________________________          Terrell Bernabe MD  [facility]  [address] [facility] [address]  Dept Phone: [phone]    
Yes

## 2020-06-05 NOTE — DISCHARGE NOTE PROVIDER - CARE PROVIDER_API CALL
Vj Lewis  CARDIOLOGY  [address] [place] [address]  Phone: [phone]  Fax: [phone]  Follow Up Time:

## 2020-06-05 NOTE — DISCHARGE NOTE PROVIDER - NSDCMRMEDTOKEN_GEN_ALL_CORE_FT
aspirin 81 mg oral delayed release tablet: 1 tab(s) orally once a day  atorvastatin 80 mg oral tablet: 1 tab(s) orally once a day (at bedtime)  Brilinta (ticagrelor) 90 mg oral tablet: 1 tab(s) orally 2 times a day   metoprolol succinate 25 mg oral tablet, extended release: 1 tab(s) orally once a day aspirin 81 mg oral delayed release tablet: 1 tab(s) orally once a day  atorvastatin 80 mg oral tablet: 1 tab(s) orally once a day (at bedtime)  Brilinta (ticagrelor) 90 mg oral tablet: 1 tab(s) orally 2 times a day

## 2020-06-05 NOTE — DISCHARGE NOTE PROVIDER - HOSPITAL COURSE
50 y/o Female with FHX CAD (Mother X2 3VCABG at age 46) and PMHx of Hyperlipidemia, Essential Thrombocytosis, CAD s/p NSTEMI 05/27/2020 s/p dx cath on 5/27/2020 showing 2VCAD, s/p cardiac cath on 05/28/2020 with DANYELLE LM, DANYELLE x 2 p/m LAD with residual RCA  with initial plan of staged PCI of RCA in 6 weeks contacted Dr Vj Lewis endorsing worsening chest discomfort with exertion of one block. Denies SOB, dizziness, palpitations, nausea, orthopnea, LE edema, claudication. These symptoms are similar how she felt prior to her MI, but now her symptoms are milder. Echo from 05/27/2020 normal EF, no valve disease. In light of pt's risk factors, CCS Class 3 symptoms, and known RCA  pt presents for recommended cardiac catheterization with staged PCI. Pt now s/p cardiac cath on 06/05/2020: DANYELLE to pRCA (70%) and DANYELLE to mRCA (100% ). Patent stents in LM and LAD. dRCA 60%. Impella was used during case. B/L PC, both sites with ooze post cath, s/p manual compression and Epi/Lido, completely resolved. UPDATE: minimal ooze noted on R groin at ~4PM which has not saturated thru gauze, soft. Will continue to monitor and update team. Tylenol 650mg PO x 1 given for soreness at R groin (unchanged from prior to admission, pt w/ ecchymosis at area from prior cath). Pt given Tung 100 x 1 during procedure. Pt's SBPs in 90s during the procedure and increased to SBPs 130s post procedure. SBPs then downtrended to SBPs 80s-90s after pt ambulated with some dull aching pain to pt's right groin area and mild left back pain relieved with repositioning. Bilateral groin sites negative for hematoma. Right groin had ecchymosis from recent prior catheterization. New right femoral bruit. Pt given 500cc bolus and CBC rechecked revealing hemoglobin drop from 12.5 to 9.8. An additional 500cc bolus and then maintenance fluids at 100cc/hr started. Interventional cardiology fellow Dr Shane and CCU fellow Dr Cota were informed who recommended a CTA abdomen/pelvis with IV contrast to r/o retroperitoneal bleed. 48 y/o Female with FHX CAD (Mother X2 3VCABG at age 46) and PMHx of Hyperlipidemia, Essential Thrombocytosis, CAD s/p NSTEMI 05/27/2020 s/p dx cath on 5/27/2020 showing 2VCAD, s/p cardiac cath on 05/28/2020 with DANYELLE LM, DANYELLE x 2 p/m LAD with residual RCA  with initial plan of staged PCI of RCA in 6 weeks contacted Dr Vj Lewis endorsing worsening chest discomfort with exertion of one block. Denies SOB, dizziness, palpitations, nausea, orthopnea, LE edema, claudication. These symptoms are similar how she felt prior to her MI, but now her symptoms are milder. Echo from 05/27/2020 normal EF, no valve disease. In light of pt's risk factors, CCS Class 3 symptoms, and known RCA  pt presents for recommended cardiac catheterization with staged PCI. Pt now s/p cardiac cath on 06/05/2020: DANYELLE to pRCA (70%) and DANYELLE to mRCA (100% ). Patent stents in LM and LAD. dRCA 60%. Impella was used during case. B/L PC, both sites with ooze post cath, s/p manual compression and Epi/Lido, completely resolved. UPDATE: minimal ooze noted on R groin at ~4PM which has not saturated thru gauze, soft. Will continue to monitor and update team. Tylenol 650mg PO x 1 given for soreness at R groin (unchanged from prior to admission, pt w/ ecchymosis at area from prior cath). Pt given Tung 100 x 1 during procedure. Pt's SBPs in 90s during the procedure and increased to SBPs 130s post procedure. SBPs then downtrended to SBPs 80s-90s after pt ambulated with some dull aching pain to pt's right groin area and mild left back pain relieved with repositioning. Bilateral groin sites negative for hematoma. Right groin had ecchymosis from recent prior catheterization. New right femoral bruit. Pt given 500cc bolus and CBC rechecked revealing hemoglobin drop from 12.5 to 9.8. An additional 500cc bolus and then maintenance fluids at 100cc/hr started. Interventional cardiology fellow Dr Shane and CCU fellow Dr Cota were informed who recommended a CTA abdomen/pelvis with IV contrast to r/o retroperitoneal bleed which was negative for RP bleed. It did show a slight hematoma around right groin which was compressed, and resolved. This AM, feels well, denies CP, SOB, n/v/d, LE edema. VSS, no events on tele, labs ???. PE sig for R groin site stable c/d/i, slight puffiness and ecchymotic but no bleeding, pain or hematoma. Patient was seen and examined by attending who agrees with above d/c plan. All meds rx to pharmacy and explained to patient. Patient instructed to return if sx worsen including not limited to chest pain, shortness of breath 48 y/o Female with FHX CAD (Mother X2 3VCABG at age 46) and PMHx of Hyperlipidemia, Essential Thrombocytosis, CAD s/p NSTEMI 05/27/2020 s/p dx cath on 5/27/2020 showing 2VCAD, s/p cardiac cath on 05/28/2020 with DANYELLE LM, DANYELLE x 2 p/m LAD with residual RCA  with initial plan of staged PCI of RCA in 6 weeks contacted Dr Vj Lewis endorsing worsening chest discomfort with exertion of one block. Denies SOB, dizziness, palpitations, nausea, orthopnea, LE edema, claudication. These symptoms are similar how she felt prior to her MI, but now her symptoms are milder. Echo from 05/27/2020 normal EF, no valve disease. In light of pt's risk factors, CCS Class 3 symptoms, and known RCA  pt presents for recommended cardiac catheterization with staged PCI. Pt now s/p cardiac cath on 06/05/2020: DANYELLE to pRCA (70%) and DANYELLE to mRCA (100% ). Patent stents in LM and LAD. dRCA 60%. Impella was used during case. B/L PC, both sites with ooze post cath, s/p manual compression and Epi/Lido, completely resolved. UPDATE: minimal ooze noted on R groin at ~4PM which has not saturated thru gauze, soft. Will continue to monitor and update team. Tylenol 650mg PO x 1 given for soreness at R groin (unchanged from prior to admission, pt w/ ecchymosis at area from prior cath). Pt given Tung 100 x 1 during procedure. Pt's SBPs in 90s during the procedure and increased to SBPs 130s post procedure. SBPs then downtrended to SBPs 80s-90s after pt ambulated with some dull aching pain to pt's right groin area and mild left back pain relieved with repositioning. Bilateral groin sites negative for hematoma. Right groin had ecchymosis from recent prior catheterization. New right femoral bruit. Pt given 500cc bolus and CBC rechecked revealing hemoglobin drop from 12.5 to 9.8. An additional 500cc bolus and then maintenance fluids at 100cc/hr started. Interventional cardiology fellow Dr Shane and CCU fellow Dr Cota were informed who recommended a CTA abdomen/pelvis with IV contrast to r/o retroperitoneal bleed which was negative for RP bleed. It did show a slight hematoma around right groin which was compressed, and resolved. This AM patient with SBP 90s given IVF 1L, now feels well, denies CP, SOB, n/v/d, LE edema. VSS, no events on tele, labs Hgb 10.7 improving. PE sig for R groin site stable c/d/i, slight puffiness and ecchymotic but no bleeding, pain or hematoma. Patient was seen and examined by attending who agrees with above d/c plan. All meds rx to pharmacy and explained to patient. Patient instructed to return if sx worsen including not limited to chest pain, shortness of breath

## 2020-06-06 ENCOUNTER — TRANSCRIPTION ENCOUNTER (OUTPATIENT)
Age: 50
End: 2020-06-06

## 2020-06-06 VITALS — TEMPERATURE: 98 F

## 2020-06-06 LAB
ANION GAP SERPL CALC-SCNC: 12 MMOL/L — SIGNIFICANT CHANGE UP (ref 5–17)
BASOPHILS # BLD AUTO: 0.02 K/UL — SIGNIFICANT CHANGE UP (ref 0–0.2)
BASOPHILS NFR BLD AUTO: 0.2 % — SIGNIFICANT CHANGE UP (ref 0–2)
BLD GP AB SCN SERPL QL: NEGATIVE — SIGNIFICANT CHANGE UP
BUN SERPL-MCNC: 9 MG/DL — SIGNIFICANT CHANGE UP (ref 7–23)
CALCIUM SERPL-MCNC: 8.2 MG/DL — LOW (ref 8.4–10.5)
CHLORIDE SERPL-SCNC: 111 MMOL/L — HIGH (ref 96–108)
CO2 SERPL-SCNC: 17 MMOL/L — LOW (ref 22–31)
CREAT SERPL-MCNC: 0.77 MG/DL — SIGNIFICANT CHANGE UP (ref 0.5–1.3)
EOSINOPHIL # BLD AUTO: 0.11 K/UL — SIGNIFICANT CHANGE UP (ref 0–0.5)
EOSINOPHIL NFR BLD AUTO: 1.2 % — SIGNIFICANT CHANGE UP (ref 0–6)
GLUCOSE SERPL-MCNC: 93 MG/DL — SIGNIFICANT CHANGE UP (ref 70–99)
HCT VFR BLD CALC: 32.6 % — LOW (ref 34.5–45)
HGB BLD-MCNC: 10.7 G/DL — LOW (ref 11.5–15.5)
IMM GRANULOCYTES NFR BLD AUTO: 0.3 % — SIGNIFICANT CHANGE UP (ref 0–1.5)
LYMPHOCYTES # BLD AUTO: 0.67 K/UL — LOW (ref 1–3.3)
LYMPHOCYTES # BLD AUTO: 7.4 % — LOW (ref 13–44)
MAGNESIUM SERPL-MCNC: 1.7 MG/DL — SIGNIFICANT CHANGE UP (ref 1.6–2.6)
MCHC RBC-ENTMCNC: 30.4 PG — SIGNIFICANT CHANGE UP (ref 27–34)
MCHC RBC-ENTMCNC: 32.8 GM/DL — SIGNIFICANT CHANGE UP (ref 32–36)
MCV RBC AUTO: 92.6 FL — SIGNIFICANT CHANGE UP (ref 80–100)
MONOCYTES # BLD AUTO: 0.4 K/UL — SIGNIFICANT CHANGE UP (ref 0–0.9)
MONOCYTES NFR BLD AUTO: 4.4 % — SIGNIFICANT CHANGE UP (ref 2–14)
NEUTROPHILS # BLD AUTO: 7.8 K/UL — HIGH (ref 1.8–7.4)
NEUTROPHILS NFR BLD AUTO: 86.5 % — HIGH (ref 43–77)
NRBC # BLD: 0 /100 WBCS — SIGNIFICANT CHANGE UP (ref 0–0)
PLATELET # BLD AUTO: 384 K/UL — SIGNIFICANT CHANGE UP (ref 150–400)
POTASSIUM SERPL-MCNC: 3.9 MMOL/L — SIGNIFICANT CHANGE UP (ref 3.5–5.3)
POTASSIUM SERPL-SCNC: 3.9 MMOL/L — SIGNIFICANT CHANGE UP (ref 3.5–5.3)
RBC # BLD: 3.52 M/UL — LOW (ref 3.8–5.2)
RBC # FLD: 13.2 % — SIGNIFICANT CHANGE UP (ref 10.3–14.5)
RH IG SCN BLD-IMP: POSITIVE — SIGNIFICANT CHANGE UP
SODIUM SERPL-SCNC: 140 MMOL/L — SIGNIFICANT CHANGE UP (ref 135–145)
WBC # BLD: 9.03 K/UL — SIGNIFICANT CHANGE UP (ref 3.8–10.5)
WBC # FLD AUTO: 9.03 K/UL — SIGNIFICANT CHANGE UP (ref 3.8–10.5)

## 2020-06-06 PROCEDURE — 74174 CTA ABD&PLVS W/CONTRAST: CPT | Mod: 26

## 2020-06-06 PROCEDURE — 99238 HOSP IP/OBS DSCHRG MGMT 30/<: CPT

## 2020-06-06 RX ORDER — ASPIRIN/CALCIUM CARB/MAGNESIUM 324 MG
1 TABLET ORAL
Qty: 30 | Refills: 11
Start: 2020-06-06 | End: 2021-05-31

## 2020-06-06 RX ORDER — TICAGRELOR 90 MG/1
1 TABLET ORAL
Qty: 60 | Refills: 11
Start: 2020-06-06 | End: 2021-05-31

## 2020-06-06 RX ORDER — SODIUM CHLORIDE 9 MG/ML
1000 INJECTION INTRAMUSCULAR; INTRAVENOUS; SUBCUTANEOUS
Refills: 0 | Status: DISCONTINUED | OUTPATIENT
Start: 2020-06-06 | End: 2020-06-06

## 2020-06-06 RX ORDER — MAGNESIUM SULFATE 500 MG/ML
1 VIAL (ML) INJECTION ONCE
Refills: 0 | Status: COMPLETED | OUTPATIENT
Start: 2020-06-06 | End: 2020-06-06

## 2020-06-06 RX ORDER — ATORVASTATIN CALCIUM 80 MG/1
1 TABLET, FILM COATED ORAL
Qty: 30 | Refills: 3
Start: 2020-06-06 | End: 2020-10-03

## 2020-06-06 RX ADMIN — Medication 100 GRAM(S): at 09:53

## 2020-06-06 RX ADMIN — Medication 650 MILLIGRAM(S): at 06:17

## 2020-06-06 RX ADMIN — TICAGRELOR 90 MILLIGRAM(S): 90 TABLET ORAL at 06:17

## 2020-06-06 RX ADMIN — SODIUM CHLORIDE 100 MILLILITER(S): 9 INJECTION INTRAMUSCULAR; INTRAVENOUS; SUBCUTANEOUS at 11:10

## 2020-06-06 RX ADMIN — Medication 81 MILLIGRAM(S): at 09:53

## 2020-06-06 NOTE — DISCHARGE NOTE NURSING/CASE MANAGEMENT/SOCIAL WORK - PATIENT PORTAL LINK FT
You can access the FollowMyHealth Patient Portal offered by [facility] by registering at the following website: [email or web address]. By joining [facility]’s FollowMyHealth portal, you will also be able to view your health information using other applications (apps) compatible with our system.

## 2020-06-06 NOTE — PROVIDER CONTACT NOTE (CHANGE IN STATUS NOTIFICATION) - ACTION/TREATMENT ORDERED:
Pt evaluated at bedside. R groin dressing changed; cdi. Ordered 2x 500ml nsl bolus, CT with contrast of abdomen and pelvis, CBC; type and screenx2, continuous nsl at 125ml/hr. continue to monitor

## 2020-06-08 PROCEDURE — 36415 COLL VENOUS BLD VENIPUNCTURE: CPT

## 2020-06-08 PROCEDURE — 82553 CREATINE MB FRACTION: CPT

## 2020-06-08 PROCEDURE — 86850 RBC ANTIBODY SCREEN: CPT

## 2020-06-08 PROCEDURE — C1753: CPT

## 2020-06-08 PROCEDURE — C1760: CPT

## 2020-06-08 PROCEDURE — 82550 ASSAY OF CK (CPK): CPT

## 2020-06-08 PROCEDURE — C1894: CPT

## 2020-06-08 PROCEDURE — 86901 BLOOD TYPING SEROLOGIC RH(D): CPT

## 2020-06-08 PROCEDURE — C1769: CPT

## 2020-06-08 PROCEDURE — 80053 COMPREHEN METABOLIC PANEL: CPT

## 2020-06-08 PROCEDURE — C1887: CPT

## 2020-06-08 PROCEDURE — C1874: CPT

## 2020-06-08 PROCEDURE — 85025 COMPLETE CBC W/AUTO DIFF WBC: CPT

## 2020-06-08 PROCEDURE — C1725: CPT

## 2020-06-08 PROCEDURE — 85027 COMPLETE CBC AUTOMATED: CPT

## 2020-06-08 PROCEDURE — 80061 LIPID PANEL: CPT

## 2020-06-08 PROCEDURE — 85730 THROMBOPLASTIN TIME PARTIAL: CPT

## 2020-06-08 PROCEDURE — 84702 CHORIONIC GONADOTROPIN TEST: CPT

## 2020-06-08 PROCEDURE — 80048 BASIC METABOLIC PNL TOTAL CA: CPT

## 2020-06-08 PROCEDURE — 74174 CTA ABD&PLVS W/CONTRAST: CPT

## 2020-06-08 PROCEDURE — 83036 HEMOGLOBIN GLYCOSYLATED A1C: CPT

## 2020-06-08 PROCEDURE — 83735 ASSAY OF MAGNESIUM: CPT

## 2020-06-08 PROCEDURE — 85610 PROTHROMBIN TIME: CPT

## 2020-06-11 DIAGNOSIS — D47.3 ESSENTIAL (HEMORRHAGIC) THROMBOCYTHEMIA: ICD-10-CM

## 2020-06-11 DIAGNOSIS — Z82.49 FAMILY HISTORY OF ISCHEMIC HEART DISEASE AND OTHER DISEASES OF THE CIRCULATORY SYSTEM: ICD-10-CM
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
## 2020-06-11 DIAGNOSIS — I97.630 POSTPROCEDURAL HEMATOMA OF A CIRCULATORY SYSTEM ORGAN OR STRUCTURE FOLLOWING A CARDIAC CATHETERIZATION: ICD-10-CM

## 2020-06-11 DIAGNOSIS — E78.5 HYPERLIPIDEMIA, UNSPECIFIED: ICD-10-CM

## 2020-06-11 DIAGNOSIS — Z95.5 PRESENCE OF CORONARY ANGIOPLASTY IMPLANT AND GRAFT: ICD-10-CM

## 2020-06-11 DIAGNOSIS — Z79.02 LONG TERM (CURRENT) USE OF ANTITHROMBOTICS/ANTIPLATELETS: ICD-10-CM

## 2020-06-11 DIAGNOSIS — I25.110 ATHEROSCLEROTIC HEART DISEASE OF NATIVE CORONARY ARTERY WITH UNSTABLE ANGINA PECTORIS: ICD-10-CM

## 2020-06-11 DIAGNOSIS — I21.4 NON-ST ELEVATION (NSTEMI) MYOCARDIAL INFARCTION: ICD-10-CM

## 2020-06-11 DIAGNOSIS — I25.82 CHRONIC TOTAL OCCLUSION OF CORONARY ARTERY: ICD-10-CM

## 2020-06-11 DIAGNOSIS — Z79.82 LONG TERM (CURRENT) USE OF ASPIRIN: ICD-10-CM

## 2020-06-11 DIAGNOSIS — I25.10 ATHEROSCLEROTIC HEART DISEASE OF NATIVE CORONARY ARTERY WITHOUT ANGINA PECTORIS: ICD-10-CM

## 2020-06-12 ENCOUNTER — APPOINTMENT (OUTPATIENT)
Dept: HEART AND VASCULAR | Facility: CLINIC | Age: 50
End: 2020-06-12
Payer: COMMERCIAL

## 2020-06-12 ENCOUNTER — APPOINTMENT (OUTPATIENT)
Dept: HEART AND VASCULAR | Facility: CLINIC | Age: 50
End: 2020-06-12

## 2020-06-12 DIAGNOSIS — Z00.00 ENCOUNTER FOR GENERAL ADULT MEDICAL EXAMINATION W/OUT ABNORMAL FINDINGS: ICD-10-CM

## 2020-06-12 PROCEDURE — 99214 OFFICE O/P EST MOD 30 MIN: CPT | Mod: 95

## 2020-06-12 NOTE — PHYSICAL EXAM
[General Appearance - Well Developed] : well developed [Normal Appearance] : normal appearance [Well Groomed] : well groomed [General Appearance - Well Nourished] : well nourished [No Deformities] : no deformities [General Appearance - In No Acute Distress] : no acute distress [Normal Jugular Venous A Waves Present] : normal jugular venous A waves present [Normal Jugular Venous V Waves Present] : normal jugular venous V waves present [No Jugular Venous Mckenzie A Waves] : no jugular venous mckenzie A waves [Respiration, Rhythm And Depth] : normal respiratory rhythm and effort [Exaggerated Use Of Accessory Muscles For Inspiration] : no accessory muscle use [Auscultation Breath Sounds / Voice Sounds] : lungs were clear to auscultation bilaterally [Heart Rate And Rhythm] : heart rate and rhythm were normal [Heart Sounds] : normal S1 and S2 [Murmurs] : no murmurs present [Nail Clubbing] : no clubbing of the fingernails [Petechial Hemorrhages (___cm)] : no petechial hemorrhages [Cyanosis, Localized] : no localized cyanosis [Skin Color & Pigmentation] : normal skin color and pigmentation [] : no rash [No Venous Stasis] : no venous stasis [Skin Lesions] : no skin lesions [No Skin Ulcers] : no skin ulcer [Oriented To Time, Place, And Person] : oriented to person, place, and time [No Xanthoma] : no  xanthoma was observed [Affect] : the affect was normal [Mood] : the mood was normal [No Anxiety] : not feeling anxious

## 2020-06-15 NOTE — REASON FOR VISIT
[FreeTextEntry1] : Patient is a 48yo F with PMHx of CAD diagnosed on 5/2020 s/p NSTEMI and PCI to pLAD and mLAD pending fixing RCA , elevated Lp(a), and essential thrombocytosis who is following up as part of our risk reduction program and Young Heart Study.

## 2020-06-15 NOTE — HISTORY OF PRESENT ILLNESS
[FreeTextEntry1] : Patient is a 48yo F with PMHx of CAD diagnosed on 5/2020 s/p NSTEMI and PCI to pLAD and mLAD pending fixing RCA , elevated Lp(a), and essential thrombocytosis who is following up as part of our risk reduction program and Young Heart Study. \par \par This visit was conducted utilizing a real-time 2-way audio visual telehealth platform, [employer]. \par \par Location of patient: Yazmin Snyder was located at her home at [address] [place] [address]\par Location of provider: Swati Galeana NP was located at [facility] [address], [address] [place] [address]\par \par Patient has provided verbal authorization to participate in this telehealth service. \par \par All components of the evaluation and management were performed per clinical routine with the exception of the physical exam.  The physical exam noted in this visit references my most recent physical exam that was performed in person, plus any additional information provided by the patient (i.e. ambulatory vitals/weight) or inspection from the video portion of the encounter. \par \par \par She is s/p cardiac cath on 5/28/2020 receiving DANYELLE x2 to p/mLAD and staged PCI on 6/5/2020 for  of RCA. She reports feeling well today and is very grateful she was able to have PCI vs CABG. She says the lingering chest pain is gone after her second PCI. \par \par Since she has a family history of premature CAD (her mother had CABGx3 at age 45) she has always been very health conscious throughout her life. She eats a healthy diet and is very active. She has also made sure her sisters get their Lpa levels checked, and even calcium scores if possible, to assess their risk (her 3 sisters all live in [place]). \par \par Mediterranean Diet Score (9 question survey) was 7. \par (8-9: optimal, 6-7: near-optimal, 4-5: suboptimal, 0-3: markedly suboptimal)\par Exercise: Patient reports exercising at a moderate level for >150 minutes per week.\par Smoking: Patient denies any history of smoking. \par Stress: Patient denies any stress. \par \par She is gradually increasing her physical activity to her previous levels and she had already been following mostly a Mediterranean dietary pattern. She did have questions about healthy options for sweeteners, "convenience" foods such as prepared packets of oatmeal vs making your own at home with plain oats, as well as supplements and adding apple cider vinegar to her diet. I explained the concerns over supplements not being regulated, and how it's better to get nutrients from the actual foods vs supplements.

## 2020-06-25 ENCOUNTER — TRANSCRIPTION ENCOUNTER (OUTPATIENT)
Age: 50
End: 2020-06-25

## 2020-06-26 ENCOUNTER — APPOINTMENT (OUTPATIENT)
Dept: HEART AND VASCULAR | Facility: CLINIC | Age: 50
End: 2020-06-26
Payer: COMMERCIAL

## 2020-06-26 PROBLEM — Z00.00 ENCOUNTER FOR PREVENTIVE HEALTH EXAMINATION: Status: ACTIVE | Noted: 2020-05-29

## 2020-06-26 PROCEDURE — 99214 OFFICE O/P EST MOD 30 MIN: CPT | Mod: 95

## 2020-06-26 NOTE — DISCUSSION/SUMMARY
[FreeTextEntry1] : Patient is a 50yo F with PMHx of CAD diagnosed on 5/2020 s/p NSTEMI and PCI to pLAD and mLAD pending fixing RCA , elevated Lp(a), and essential thrombocytosis who is following up as part of our risk reduction program and Young Heart Study. \par \par She has been gradually increasing her walking, and I suggested she find ways to incorporate at least 2 days per week of strength training, such as resistance bands, which would be an easy way to ease into using weights and easily adjust the bands as needed as she increases her strength, plus it is a gentler movement vs dumbbells and can be done in the safety of her own home while COVID is still a concern. Also suggested she look online for exercise videos to add variety to her routine. Also suggested stretching before and after all activity. \par \par Mentioned [employer] as a great resource for nutrition and new recipes, etc. She cooks mostly at home and eats very well already, so little tweaks here and there are all she really needs in regard to her diet.\par \par She will follow up with me after she sees Dr. Lewis next week, and we will continue to optimize her lifestyle and risk factors. Plan to recheck her labs in approximately one month.  \par \par

## 2020-06-26 NOTE — REASON FOR VISIT
[FreeTextEntry1] : Patient is a 50yo F with PMHx of CAD diagnosed on 5/2020 s/p NSTEMI and PCI to pLAD and mLAD pending fixing RCA , elevated Lp(a), and essential thrombocytosis who is following up as part of our risk reduction program and Young Heart Study.

## 2020-06-26 NOTE — HISTORY OF PRESENT ILLNESS
[FreeTextEntry1] : Patient is a 50yo F with PMHx of CAD diagnosed on 5/2020 s/p NSTEMI and PCI to pLAD and mLAD pending fixing RCA , elevated Lp(a), and essential thrombocytosis who is following up as part of our risk reduction program and Young Heart Study. \par \par This visit was conducted utilizing a real-time 2-way audio visual telehealth platform, [employer]. \par \par Location of patient: Yazmin Snyder was located at her home at [address] [place] [address]\par Location of provider: Swati Galeana NP was located at [facility] [address], [address] [place] [address]\par \par Patient has provided verbal authorization to participate in this telehealth service. \par \par All components of the evaluation and management were performed per clinical routine with the exception of the physical exam. The physical exam noted in this visit references my most recent physical exam that was performed in person, plus any additional information provided by the patient (i.e. ambulatory vitals/weight) or inspection from the video portion of the encounter. \par \par \par She is s/p cardiac cath on 5/28/2020 receiving DANYELLE x2 to p/mLAD and staged PCI on 6/5/2020 for  of RCA. \par \par She reports feeling well today and has been trying to increase her walking every day. She does report some discomfort in the middle of her shoulder blades that can occur during her walks and resolve after resting. She denies any chest pain, SOB, palpitations, dizziness, diaphoresis or headaches. It could be musculoskeletal and advised her to try and take some OTC painkiller like Tylenol before going on her walk or afterwards to see if it will help the discomfort. Also suggested she do some stretching before and after being active to see if that will help.\par \par Additionally, she says she was taken off the metoprolol in the hospital 2/2 low BP and has not restarted yet. She is not taking her BP regularly at home, which is suggested she buy a machine and begin to monitor it at home. She is scheduled to see Dr. Lewis on Monday, and I suggested she bring up the back discomfort and the metoprolol. Otherwise, she is compliant with her medications and is tolerating them well. No signs of abnormal bleeding (her last period started in the hospital, so she has not had another one yet since she's been home, but is due to start soon). \par \par She continues to follow a healthy Mediterranean dietary pattern and reports her  also eats very well. No issues with having to prepare separate meals, etc. \par \par She has been working on good sleep habits since we last spoke - switching her screens to night mode to lessen the blue light, and shutting off screens altogether before she is ready to begin winding down for bed. She reports some nights she is almost not able to stay awake past 10pm now, and has been sleeping better overall. \par \par

## 2020-06-29 ENCOUNTER — APPOINTMENT (OUTPATIENT)
Dept: HEART AND VASCULAR | Facility: CLINIC | Age: 50
End: 2020-06-29
Payer: COMMERCIAL

## 2020-06-29 ENCOUNTER — TRANSCRIPTION ENCOUNTER (OUTPATIENT)
Age: 50
End: 2020-06-29

## 2020-06-29 VITALS
SYSTOLIC BLOOD PRESSURE: 124 MMHG | DIASTOLIC BLOOD PRESSURE: 74 MMHG | TEMPERATURE: 99.1 F | HEART RATE: 75 BPM | OXYGEN SATURATION: 97 %

## 2020-06-29 DIAGNOSIS — Z95.5 PRESENCE OF CORONARY ANGIOPLASTY IMPLANT AND GRAFT: ICD-10-CM

## 2020-06-29 DIAGNOSIS — R07.89 OTHER CHEST PAIN: ICD-10-CM

## 2020-06-29 DIAGNOSIS — Z86.39 PERSONAL HISTORY OF OTHER ENDOCRINE, NUTRITIONAL AND METABOLIC DISEASE: ICD-10-CM

## 2020-06-29 DIAGNOSIS — Z86.03 PERSONAL HISTORY OF NEOPLASM OF UNCERTAIN BEHAVIOR: ICD-10-CM

## 2020-06-29 PROCEDURE — 99203 OFFICE O/P NEW LOW 30 MIN: CPT

## 2020-06-29 RX ORDER — ASPIRIN 81 MG/1
81 TABLET, COATED ORAL
Refills: 0 | Status: DISCONTINUED | COMMUNITY
Start: 2020-05-29 | End: 2020-06-29

## 2020-07-01 ENCOUNTER — TRANSCRIPTION ENCOUNTER (OUTPATIENT)
Age: 50
End: 2020-07-01

## 2020-07-06 ENCOUNTER — TRANSCRIPTION ENCOUNTER (OUTPATIENT)
Age: 50
End: 2020-07-06

## 2020-07-08 ENCOUNTER — TRANSCRIPTION ENCOUNTER (OUTPATIENT)
Age: 50
End: 2020-07-08

## 2020-07-14 ENCOUNTER — TRANSCRIPTION ENCOUNTER (OUTPATIENT)
Age: 50
End: 2020-07-14

## 2020-07-15 ENCOUNTER — TRANSCRIPTION ENCOUNTER (OUTPATIENT)
Age: 50
End: 2020-07-15

## 2020-07-16 ENCOUNTER — TRANSCRIPTION ENCOUNTER (OUTPATIENT)
Age: 50
End: 2020-07-16

## 2020-07-17 ENCOUNTER — APPOINTMENT (OUTPATIENT)
Dept: HEART AND VASCULAR | Facility: CLINIC | Age: 50
End: 2020-07-17
Payer: COMMERCIAL

## 2020-07-17 PROCEDURE — 99215 OFFICE O/P EST HI 40 MIN: CPT | Mod: 95

## 2020-07-17 NOTE — PHYSICAL EXAM
[General Appearance - Well Developed] : well developed [Normal Appearance] : normal appearance [Well Groomed] : well groomed [General Appearance - Well Nourished] : well nourished [No Deformities] : no deformities [General Appearance - In No Acute Distress] : no acute distress [Normal Conjunctiva] : the conjunctiva exhibited no abnormalities [Eyelids - No Xanthelasma] : the eyelids demonstrated no xanthelasmas [Normal Jugular Venous A Waves Present] : normal jugular venous A waves present [Normal Jugular Venous V Waves Present] : normal jugular venous V waves present [No Jugular Venous Mckenzie A Waves] : no jugular venous mckenzie A waves [Exaggerated Use Of Accessory Muscles For Inspiration] : no accessory muscle use [Respiration, Rhythm And Depth] : normal respiratory rhythm and effort [Auscultation Breath Sounds / Voice Sounds] : lungs were clear to auscultation bilaterally [Heart Rate And Rhythm] : heart rate and rhythm were normal [Heart Sounds] : normal S1 and S2 [Murmurs] : no murmurs present [Abnormal Walk] : normal gait [Gait - Sufficient For Exercise Testing] : the gait was sufficient for exercise testing [Nail Clubbing] : no clubbing of the fingernails [Cyanosis, Localized] : no localized cyanosis [Petechial Hemorrhages (___cm)] : no petechial hemorrhages [] : no rash [Skin Color & Pigmentation] : normal skin color and pigmentation [Skin Lesions] : no skin lesions [No Venous Stasis] : no venous stasis [No Xanthoma] : no  xanthoma was observed [No Skin Ulcers] : no skin ulcer [Oriented To Time, Place, And Person] : oriented to person, place, and time [Affect] : the affect was normal [No Anxiety] : not feeling anxious [Mood] : the mood was normal

## 2020-07-20 NOTE — HISTORY OF PRESENT ILLNESS
[FreeTextEntry1] : Patient is a 50yo F with PMHx of CAD diagnosed on 5/2020 s/p NSTEMI and PCI to pLAD and mLAD pending fixing RCA , elevated Lp(a), and essential thrombocytosis who is following up as part of our risk reduction program and Young Heart Study. \par \par This visit was conducted utilizing a real-time 2-way audio visual telehealth platform, [employer]. \par \par All components of the evaluation and management were performed per clinical routine with the exception of the physical exam.  The physical exam noted in this visit references my most recent physical exam that was performed in person, plus any additional information provided by the patient (i.e. ambulatory vitals/weight) or inspection from the video portion of the encounter. \par \par Location of patient: Yazmin Snyder was located at her home at [address] [place] [address]\par Location of provider: LOLITA CISNEROS was located in the office at [address] [place] [address]\par \par Patient has provided verbal authorization to participate in this telehealth service. \par \par \par I have spent 40 minutes with the patient face-to-face discussing lifestyle modifications and ASCVD risk reduction. \par \par She was switched from metoprolol to ranexa by Dr. Lewis, and she reports feeling better and feels it has improved her back pain. She continues to denote discomfort/pressure in her back when walking, but reports it is now less intense and happens later in her walks than before. \par \par She was eating virtually no fruit before, and has worked hard to increase her fruit intake in her diet. \par \par She has not started strength training yet, but plans to.\par \par Is due to follow up w/ her PCP soon. Can get labs done then.

## 2020-07-20 NOTE — DISCUSSION/SUMMARY
[FreeTextEntry1] : Patient is a 48yo F with PMHx of CAD diagnosed on 5/2020 s/p NSTEMI and PCI to pLAD and mLAD pending fixing RCA , elevated Lp(a), and essential thrombocytosis who is following up as part of our risk reduction program and Young Heart Study. \par \par She was switched from metoprolol to Ranexa by Dr. Lewis, and she reports feeling better and feels it has improved her back pain. She will continue to work on improving her conditioning. She has not started strength training yet, but plans to.\par \par She was eating virtually no fruit before, and has worked hard to increase her fruit intake in her diet. We also discussed time restricted eating.\par \par Will refer her for genetics counseling.\par \par Is due to follow up w/ her PCP soon. Can get labs done then.

## 2020-10-01 ENCOUNTER — APPOINTMENT (OUTPATIENT)
Dept: CARDIOLOGY | Facility: CLINIC | Age: 50
End: 2020-10-01
Payer: COMMERCIAL

## 2020-10-01 VITALS
OXYGEN SATURATION: 98 % | DIASTOLIC BLOOD PRESSURE: 65 MMHG | WEIGHT: 151 LBS | BODY MASS INDEX: 25.16 KG/M2 | HEIGHT: 65 IN | HEART RATE: 62 BPM | TEMPERATURE: 98.1 F | SYSTOLIC BLOOD PRESSURE: 108 MMHG

## 2020-10-01 DIAGNOSIS — Z82.49 FAMILY HISTORY OF ISCHEMIC HEART DISEASE AND OTHER DISEASES OF THE CIRCULATORY SYSTEM: ICD-10-CM

## 2020-10-01 PROCEDURE — 93000 ELECTROCARDIOGRAM COMPLETE: CPT

## 2020-10-01 PROCEDURE — 99358 PROLONG SERVICE W/O CONTACT: CPT | Mod: 59

## 2020-10-01 PROCEDURE — 99244 OFF/OP CNSLTJ NEW/EST MOD 40: CPT

## 2020-10-01 PROCEDURE — 96040: CPT | Mod: NC

## 2020-10-01 NOTE — FAMILY HISTORY
[FreeTextEntry1] : FamilyHistory_20_twCiteListControlStart FamilyHistory_20_twCiteListControlEnd [id number] \par A four-generation family history was constructed and scanned into [employer]. \par Family history is significant for: \par siblings\par 3 sisters\par 53yo- ovanrian issues benign tumors, gallbladder removed, hx pcos, no hx HLD, possible thyroid problems on synthroid- no children\par 50yo- hx HLD diag in mid 30s, on crestor, eye problems - \par 47yo hx of HLD on lipitor/crestor elevated Lpa, hx ovarian tumors benign s/p removal, thyroid (unk hypo or hyper), no children\par \par Mother 76 yo, hx CAD s/p CABG tripple  x2 1st at age 45 , 2nd at 65 yo, \par HLD, no Lpa checked, on statin and repatha\par Maternal aunts x3: 70-80s, unk if HLD no hx of MI or CABG\par 73yo aunt 2 sons 50 and 49 yo\par 76 yo aunt 52 and 50 son and daughter\par 81 yo aunt 52 and 53 yo son\par Maternal uncles x1 dec suicide\par Maternal Grandmother dec 90s healthy\par Maternal Grandfather dec MI 60, sudden\par \par Father dec 74yo hx bone ca, prior prostate CA treated\par Paternal aunts x6 all dec, one dec in infancy age 2, one with hx MS\par Paternal uncles x2 all dec: one with hx sudden MI in his 70s, other uncle dec late 60s cause unk\par one cousin with hx thyroid CA and hx of "heart issue"\par Paternal Grandfather dec hx unk\par Paternal Grandmother dec hx unk  old age\par \par children:none\par \par her maternal families originate from [facility] and [place] and paternal families originate from [place]\par \par No Ashkenazi Moravian ancestry. \par Family history was negative for consanguinity  \par possible family history of SIDS\par  \par \par

## 2020-10-01 NOTE — PHYSICAL EXAM
[Normal] : without joint laxity or contractures [de-identified] : tendon xanthoma thickening, [de-identified] : no eyelid xanthoma, no arcus

## 2020-10-01 NOTE — REASON FOR VISIT
[FreeTextEntry3] : AGUILA CARLOS  is being seen  for an initial consultation at the [facility] at [facility] on 10/01/2020.   Ms. CARLOS was referred by Dr Guallpa she was referred for hereditary cardiac predisposition risk assessment and counseling, due to her hx of HLD, early onset MI, and elevated lpa\par \par

## 2020-10-01 NOTE — HISTORY OF PRESENT ILLNESS
[FreeTextEntry1] : Ms. AGUILA CARLOS  is a 49 year-old woman seen on 10/01/2020 She Has a PMH early onset CAD s/p NSTEMI PCI to ostial LM, pLAD and mLAD as well as RCA \par Lpa and lipids elevated\par \par She states she was first diagnosed \par She states she has been followed for cholesterol with usually borderline levels of cholesterol \par in 2015 she began feeling twinges in her chest with occasional throat pressure with exertion. \par She saw a cardiologist then had a nml stress test ETT and nml EKG\par attributed to acid refulx treated with nexium\par in the past year she has reoccurrence of the symptoms and attributed them to acid reflux. \par worse with exertion, ashtyn when climbing [place] [address]\par the day prior to her MI she woke up at night with the same symptoms, and sat up to sleep on the couch\par no relief with meds for acid reflux, worse with bending over\par \par The following day she had worse symptoms after walking 2 blocks and had telehealth appt and went to the ED with pain burning and pressure in neck and jaw radiating to ears and down arms\par She had a nml EKG but elevated troponins\par She had an angiogram with findings of multi vessel disease was referred for CABG but she requested PCI and had staged PCI\par \par today she presents for a cardiogenomic evaluation\par

## 2020-11-19 ENCOUNTER — APPOINTMENT (OUTPATIENT)
Dept: CARDIOLOGY | Facility: CLINIC | Age: 50
End: 2020-11-19

## 2020-11-30 LAB
MISCELLANEOUS TEST: NORMAL
PROC NAME: NORMAL

## 2020-12-07 ENCOUNTER — NON-APPOINTMENT (OUTPATIENT)
Age: 50
End: 2020-12-07

## 2020-12-07 ENCOUNTER — APPOINTMENT (OUTPATIENT)
Dept: HEART AND VASCULAR | Facility: CLINIC | Age: 50
End: 2020-12-07
Payer: COMMERCIAL

## 2020-12-07 VITALS
BODY MASS INDEX: 25.33 KG/M2 | SYSTOLIC BLOOD PRESSURE: 114 MMHG | HEIGHT: 65 IN | WEIGHT: 152 LBS | OXYGEN SATURATION: 97 % | HEART RATE: 63 BPM | DIASTOLIC BLOOD PRESSURE: 76 MMHG

## 2020-12-07 PROCEDURE — 99213 OFFICE O/P EST LOW 20 MIN: CPT

## 2020-12-07 PROCEDURE — 99072 ADDL SUPL MATRL&STAF TM PHE: CPT

## 2020-12-07 RX ORDER — METOPROLOL TARTRATE 25 MG/1
25 TABLET, FILM COATED ORAL
Qty: 90 | Refills: 3 | Status: DISCONTINUED | COMMUNITY
Start: 2020-06-29 | End: 2020-12-07

## 2020-12-08 ENCOUNTER — APPOINTMENT (OUTPATIENT)
Dept: HEART AND VASCULAR | Facility: CLINIC | Age: 50
End: 2020-12-08

## 2020-12-08 ENCOUNTER — NON-APPOINTMENT (OUTPATIENT)
Age: 50
End: 2020-12-08

## 2020-12-08 LAB
ALBUMIN SERPL ELPH-MCNC: 5 G/DL
ALP BLD-CCNC: 49 U/L
ALT SERPL-CCNC: 16 U/L
ANION GAP SERPL CALC-SCNC: 12 MMOL/L
AST SERPL-CCNC: 22 U/L
BASOPHILS # BLD AUTO: 0.03 K/UL
BASOPHILS NFR BLD AUTO: 0.5 %
BILIRUB SERPL-MCNC: 0.5 MG/DL
BUN SERPL-MCNC: 14 MG/DL
CALCIUM SERPL-MCNC: 10.1 MG/DL
CHLORIDE SERPL-SCNC: 105 MMOL/L
CHOLEST SERPL-MCNC: 182 MG/DL
CO2 SERPL-SCNC: 24 MMOL/L
CREAT SERPL-MCNC: 0.85 MG/DL
EOSINOPHIL # BLD AUTO: 0.16 K/UL
EOSINOPHIL NFR BLD AUTO: 2.9 %
ESTIMATED AVERAGE GLUCOSE: 100 MG/DL
GLUCOSE SERPL-MCNC: 110 MG/DL
HBA1C MFR BLD HPLC: 5.1 %
HCT VFR BLD CALC: 41.8 %
HDLC SERPL-MCNC: 75 MG/DL
HGB BLD-MCNC: 12.9 G/DL
IMM GRANULOCYTES NFR BLD AUTO: 0.2 %
LDLC SERPL CALC-MCNC: 89 MG/DL
LYMPHOCYTES # BLD AUTO: 1.58 K/UL
LYMPHOCYTES NFR BLD AUTO: 28.5 %
MAN DIFF?: NORMAL
MCHC RBC-ENTMCNC: 30.9 GM/DL
MCHC RBC-ENTMCNC: 31.6 PG
MCV RBC AUTO: 102.5 FL
MONOCYTES # BLD AUTO: 0.4 K/UL
MONOCYTES NFR BLD AUTO: 7.2 %
NEUTROPHILS # BLD AUTO: 3.37 K/UL
NEUTROPHILS NFR BLD AUTO: 60.7 %
NONHDLC SERPL-MCNC: 107 MG/DL
PLATELET # BLD AUTO: 438 K/UL
POTASSIUM SERPL-SCNC: 4.6 MMOL/L
PROT SERPL-MCNC: 7.6 G/DL
RBC # BLD: 4.08 M/UL
RBC # FLD: 13.8 %
SODIUM SERPL-SCNC: 141 MMOL/L
TRIGL SERPL-MCNC: 89 MG/DL
TSH SERPL-ACNC: 1.59 UIU/ML
WBC # FLD AUTO: 5.55 K/UL

## 2020-12-09 NOTE — DISCUSSION/SUMMARY
[FreeTextEntry1] : 50F, hx of CAD s/p PCI here for a followup visit\par \par CAD s/p PCI: stable from a cardiopulmonary perspective. Agrees to cont on ASA/Brillinta for DAPT. Cont on Atrovastatin 80mg. Lipid Panel resent today. Will switch BBlocker to Toprol XL daily.\par \par HPL: Cont on Atorvastatin\par \par F.U in 6 months

## 2020-12-09 NOTE — PHYSICAL EXAM
[General Appearance - Well Developed] : well developed [Normal Appearance] : normal appearance [Well Groomed] : well groomed [General Appearance - Well Nourished] : well nourished [No Deformities] : no deformities [General Appearance - In No Acute Distress] : no acute distress [Normal Conjunctiva] : the conjunctiva exhibited no abnormalities [Normal Oral Mucosa] : normal oral mucosa [Normal Jugular Venous V Waves Present] : normal jugular venous V waves present [] : no respiratory distress [Respiration, Rhythm And Depth] : normal respiratory rhythm and effort [Exaggerated Use Of Accessory Muscles For Inspiration] : no accessory muscle use [Auscultation Breath Sounds / Voice Sounds] : lungs were clear to auscultation bilaterally [Heart Rate And Rhythm] : heart rate and rhythm were normal [Heart Sounds] : normal S1 and S2 [Murmurs] : no murmurs present [Arterial Pulses Normal] : the arterial pulses were normal [Edema] : no peripheral edema present [Bowel Sounds] : normal bowel sounds [Abdomen Soft] : soft [Abdomen Tenderness] : non-tender [Abnormal Walk] : normal gait [Cyanosis, Localized] : no localized cyanosis [Oriented To Time, Place, And Person] : oriented to person, place, and time [Impaired Insight] : insight and judgment were intact [Affect] : the affect was normal

## 2020-12-09 NOTE — HISTORY OF PRESENT ILLNESS
[FreeTextEntry1] : 50F hx of CAD s/p PCI in June, 2020 on ASA/Brillinta presenting for follow up visit. Pt reports that she is back to her usual state of health. Has lost ~10lbs through diet and exercise. The discomfort she had going up a hill after the PCIs is now resolved. Has noticed easy bruising while on Brillinta but not enough to concern her. Denies chest pain, sob, palpitaions, diaphoresis or weakness today.

## 2020-12-16 ENCOUNTER — APPOINTMENT (OUTPATIENT)
Dept: CARDIOLOGY | Facility: CLINIC | Age: 50
End: 2020-12-16

## 2020-12-16 ENCOUNTER — APPOINTMENT (OUTPATIENT)
Dept: CARDIOLOGY | Facility: CLINIC | Age: 50
End: 2020-12-16
Payer: COMMERCIAL

## 2020-12-16 PROCEDURE — 99215 OFFICE O/P EST HI 40 MIN: CPT | Mod: 95

## 2020-12-16 NOTE — REASON FOR VISIT
[FreeTextEntry3] : AGUILA CARLOS was seen  for an initial consultation at the [facility] at [facility] on 10/01/2020.   Ms. CARLOS was referred by Dr Guallpa she was referred for hereditary cardiac predisposition risk assessment and counseling, due to her hx of HLD, early onset MI, and elevated lpa\par \par

## 2020-12-16 NOTE — PHYSICAL EXAM
[Normal] : without joint laxity or contractures [de-identified] : tendon xanthoma thickening, [de-identified] : no eyelid xanthoma, no arcus

## 2020-12-16 NOTE — HISTORY OF PRESENT ILLNESS
[FreeTextEntry1] : Ms. AGUILA CARLOS  is a 49 year-old woman initially seen on 10/01/2020 She Has a PMH early onset CAD s/p NSTEMI PCI to ostial LM, pLAD and mLAD as well as RCA \par Lpa and lipids elevated\par \par She states she was first diagnosed \par She states she has been followed for cholesterol with usually borderline levels of cholesterol \par in 2015 she began feeling twinges in her chest with occasional throat pressure with exertion. \par She saw a cardiologist then had a nml stress test ETT and nml EKG\par attributed to acid reflux treated with nexium\par in the past year she has reoccurrence of the symptoms and attributed them to acid reflux. \par worse with exertion, ashtyn when climbing [place] [address]\par the day prior to her MI she woke up at night with the same symptoms, and sat up to sleep on the couch\par no relief with meds for acid reflux, worse with bending over\par \par The following day she had worse symptoms after walking 2 blocks and had telehealth appt and went to the ED with pain burning and pressure in neck and jaw radiating to ears and down arms\par She had a nml EKG but elevated troponins\par She had an angiogram with findings of multi vessel disease was referred for CABG but she requested PCI and had staged PCI\par \par She underwent genetic testing and today presents for results\par

## 2020-12-16 NOTE — FAMILY HISTORY
[FreeTextEntry1] : FamilyHistory_20_twCiteListControlStart FamilyHistory_20_twCiteListControlEnd [id number] \par A four-generation family history was constructed and scanned into [employer]. \par Family history is significant for: \par siblings\par 3 sisters\par 51yo- ovanrian issues benign tumors, gallbladder removed, hx pcos, no hx HLD, possible thyroid problems on synthroid- no children\par 52yo- hx HLD diag in mid 30s, on crestor, eye problems - \par 45yo hx of HLD on lipitor/crestor elevated Lpa, hx ovarian tumors benign s/p removal, thyroid (unk hypo or hyper), no children\par \par Mother 78 yo, hx CAD s/p CABG tripple  x2 1st at age 45 , 2nd at 67 yo, \par HLD, no Lpa checked, on statin and repatha\par Maternal aunts x3: 70-80s, unk if HLD no hx of MI or CABG\par 75yo aunt 2 sons 50 and 47 yo\par 76 yo aunt 52 and 50 son and daughter\par 81 yo aunt 52 and 55 yo son\par Maternal uncles x1 dec suicide\par Maternal Grandmother dec 90s healthy\par Maternal Grandfather dec MI 60, sudden\par \par Father dec 76yo hx bone ca, prior prostate CA treated\par Paternal aunts x6 all dec, one dec in infancy age 2, one with hx MS\par Paternal uncles x2 all dec: one with hx sudden MI in his 70s, other uncle dec late 60s cause unk\par one cousin with hx thyroid CA and hx of "heart issue"\par Paternal Grandfather dec hx unk\par Paternal Grandmother dec hx unk  old age\par \par children:none\par \par her maternal families originate from [facility] and [place] and paternal families originate from [place]\par \par No Ashkenazi Spiritism ancestry. \par Family history was negative for consanguinity  \par possible family history of SIDS\par  \par \par

## 2020-12-21 ENCOUNTER — APPOINTMENT (OUTPATIENT)
Dept: HEART AND VASCULAR | Facility: CLINIC | Age: 50
End: 2020-12-21
Payer: SUBSIDIZED

## 2020-12-21 VITALS
DIASTOLIC BLOOD PRESSURE: 49 MMHG | WEIGHT: 155 LBS | OXYGEN SATURATION: 99 % | BODY MASS INDEX: 25.83 KG/M2 | SYSTOLIC BLOOD PRESSURE: 110 MMHG | HEART RATE: 55 BPM | HEIGHT: 65 IN

## 2020-12-21 PROCEDURE — XXXXX: CPT

## 2020-12-21 NOTE — REASON FOR VISIT
[FreeTextEntry1] : Patient is a 48yo F with PMHx of CAD diagnosed on 5/2020 s/p NSTEMI and PCI to pLAD and mLAD pending fixing RCA , elevated Lp(a), and essential thrombocytosis who presents today for enrollment in [employer] study.

## 2020-12-21 NOTE — PHYSICAL EXAM
[General Appearance - Well Developed] : well developed [Normal Appearance] : normal appearance [Well Groomed] : well groomed [General Appearance - Well Nourished] : well nourished [No Deformities] : no deformities [General Appearance - In No Acute Distress] : no acute distress [Normal Conjunctiva] : the conjunctiva exhibited no abnormalities [Eyelids - No Xanthelasma] : the eyelids demonstrated no xanthelasmas [Normal Jugular Venous A Waves Present] : normal jugular venous A waves present [Normal Jugular Venous V Waves Present] : normal jugular venous V waves present [No Jugular Venous Mckenzie A Waves] : no jugular venous mckenzie A waves [Respiration, Rhythm And Depth] : normal respiratory rhythm and effort [Exaggerated Use Of Accessory Muscles For Inspiration] : no accessory muscle use [Auscultation Breath Sounds / Voice Sounds] : lungs were clear to auscultation bilaterally [Heart Rate And Rhythm] : heart rate and rhythm were normal [Heart Sounds] : normal S1 and S2 [Murmurs] : no murmurs present [Abnormal Walk] : normal gait [Gait - Sufficient For Exercise Testing] : the gait was sufficient for exercise testing [Nail Clubbing] : no clubbing of the fingernails [Cyanosis, Localized] : no localized cyanosis [Petechial Hemorrhages (___cm)] : no petechial hemorrhages [Skin Color & Pigmentation] : normal skin color and pigmentation [] : no rash [No Venous Stasis] : no venous stasis [Skin Lesions] : no skin lesions [No Skin Ulcers] : no skin ulcer [No Xanthoma] : no  xanthoma was observed [Oriented To Time, Place, And Person] : oriented to person, place, and time [Affect] : the affect was normal [Mood] : the mood was normal [No Anxiety] : not feeling anxious

## 2020-12-21 NOTE — HISTORY OF PRESENT ILLNESS
[FreeTextEntry1] : Patient is a 50yo F with PMHx of CAD diagnosed on 5/2020 s/p NSTEMI and PCI to pLAD and mLAD pending fixing RCA , elevated Lp(a), and essential thrombocytosis who presents today for enrollment in [employer] study.\par \par Patient reports feeling well today. Patient denies any chest pain, SOB, palpitations, orthopnea, PND or LE edema.\par

## 2020-12-21 NOTE — DISCUSSION/SUMMARY
[FreeTextEntry1] : Patient is a 50yo F with PMHx of CAD diagnosed on 5/2020 s/p NSTEMI and PCI to pLAD and mLAD pending fixing RCA , elevated Lp(a), and essential thrombocytosis who presents today for enrollment in [employer] study.\par \par Consent obtained today.

## 2020-12-22 NOTE — ED PROVIDER NOTE - NS_EDPROVIDERDISPOUSERTYPE_ED_A_ED
[de-identified] : Status-post left total knee arthroplasty here for initial postoperative evaluation. Excellent progress is noted in terms of pain and restoration of function. Pain is well controlled with oral medications. There has been no change in medical health since discharge. The patient does require assistive devices.\par  Scribe Attestation (For Scribes USE Only)... Attending Attestation (For Attendings USE Only).../Scribe Attestation (For Scribes USE Only)...

## 2020-12-23 LAB
CHOLEST SERPL-MCNC: 151 MG/DL
HDLC SERPL-MCNC: 65 MG/DL
LDLC SERPL CALC-MCNC: 70 MG/DL
NONHDLC SERPL-MCNC: 86 MG/DL
TRIGL SERPL-MCNC: 79 MG/DL

## 2021-06-14 ENCOUNTER — APPOINTMENT (OUTPATIENT)
Dept: HEART AND VASCULAR | Facility: CLINIC | Age: 51
End: 2021-06-14
Payer: COMMERCIAL

## 2021-06-14 ENCOUNTER — NON-APPOINTMENT (OUTPATIENT)
Age: 51
End: 2021-06-14

## 2021-06-14 VITALS
HEART RATE: 58 BPM | SYSTOLIC BLOOD PRESSURE: 117 MMHG | OXYGEN SATURATION: 96 % | HEIGHT: 65 IN | BODY MASS INDEX: 25.99 KG/M2 | WEIGHT: 156 LBS | DIASTOLIC BLOOD PRESSURE: 65 MMHG

## 2021-06-14 PROCEDURE — 99213 OFFICE O/P EST LOW 20 MIN: CPT

## 2021-06-14 PROCEDURE — 99072 ADDL SUPL MATRL&STAF TM PHE: CPT

## 2021-06-14 RX ORDER — TICAGRELOR 90 MG/1
90 TABLET ORAL TWICE DAILY
Qty: 180 | Refills: 1 | Status: DISCONTINUED | COMMUNITY
Start: 2020-07-27 | End: 2021-06-14

## 2021-06-15 NOTE — ED PROVIDER NOTE - NS ED MD DISPO DIVISION
Department of Psychiatry  Consult Service  Progress Note     Reason for Consult: confusion    SUBJECTIVE:    Patient is more alert and oriented today. She is oriented to self place year and some to situation. She did agree that she has been getting increasingly confused before getting here. Family mentioned that patient want to go home with hospice care. Discussed at length about the medication concerns. Family clearly mentioned that they do not want the patient to go to [facility] psych at this time. Agreed to the plan.   OBJECTIVE      Medications  Current Facility-Administered Medications: amLODIPine (NORVASC) tablet 5 mg, 5 mg, Oral, Daily  ziprasidone (GEODON) injection 10 mg, 10 mg, Intramuscular, Q12H PRN **AND** sterile water injection 1.2 mL, 1.2 mL, Intramuscular, Q12H PRN  LORazepam (ATIVAN) injection 0.5 mg, 0.5 mg, Intravenous, Q12H PRN  baclofen (LIORESAL) tablet 5 mg, 5 mg, Oral, BID  QUEtiapine (SEROQUEL) tablet 25 mg, 25 mg, Oral, Nightly  hydrALAZINE (APRESOLINE) injection 5 mg, 5 mg, Intravenous, Q6H PRN  LORazepam (ATIVAN) tablet 0.5 mg, 0.5 mg, Oral, Q12H PRN  oxyCODONE-acetaminophen (PERCOCET) 5-325 MG per tablet 1 tablet, 1 tablet, Oral, Q6H PRN  Carbidopa-Levodopa ER 48. MG CPCR 1 capsule, 1 capsule, Oral, 5x Daily  Carbidopa-Levodopa ER 36. MG CPCR 1 capsule, 1 capsule, Oral, 5x Daily  escitalopram (LEXAPRO) tablet 20 mg, 20 mg, Oral, Daily  LORazepam (ATIVAN) tablet 0.5 mg, 0.5 mg, Oral, TID  Carbidopa-Levodopa ER 36. MG CPCR 1 capsule, 1 capsule, Oral, BID PRN  Carbidopa-Levodopa ER 48. MG CPCR 1 capsule, 1 capsule, Oral, BID PRN  albuterol (PROVENTIL) nebulizer solution 2.5 mg, 2.5 mg, Nebulization, Q2H PRN  albuterol sulfate  (90 Base) MCG/ACT inhaler 2 puff, 2 puff, Inhalation, Q4H PRN  traZODone (DESYREL) tablet 50 mg, 50 mg, Oral, Nightly  sodium chloride flush 0.9 % injection 5-40 mL, 5-40 mL, Intravenous, 2 times per day  sodium chloride flush 0.9 % injection 5-40 mL, 5-40 mL, Intravenous, PRN  0.9 % sodium chloride infusion, 25 mL, Intravenous, PRN  enoxaparin (LOVENOX) injection 30 mg, 30 mg, Subcutaneous, Daily  ondansetron (ZOFRAN-ODT) disintegrating tablet 4 mg, 4 mg, Oral, Q8H PRN **OR** ondansetron (ZOFRAN) injection 4 mg, 4 mg, Intravenous, Q6H PRN  polyethylene glycol (GLYCOLAX) packet 17 g, 17 g, Oral, Daily PRN  acetaminophen (TYLENOL) tablet 650 mg, 650 mg, Oral, Q6H PRN **OR** acetaminophen (TYLENOL) suppository 650 mg, 650 mg, Rectal, Q6H PRN     Physical  /82   Pulse 93   Temp 97.2 °F (36.2 °C) (Oral)   Resp 16   Ht 5' (1.524 m)   Wt 98 lb 3.2 oz (44.5 kg)   SpO2 98%   BMI 19.18 kg/m²     Mental Status Examination:    Level of consciousness:  Within normal limits  Appearance: good grooming  Behavior/Motor: bilateral resting tremors  Attitude toward examiner:  cooperative  Speech:  Spontaneous, normal rate and volume  Mood:  anxioys  Affect:  Full range  Thought processes: coherent  Thought content: deies Suicidal ideations, denies any homicidal ideations. denies hallucinations or delusions, does not appear to be responding to internal stimuli   Memory: age appropriate  Insight & Judgement: improving  Medication side effects:  denies       ASSESSMENT    Parkinsons dementia with behavioral disturbances    Patient Active Problem List   Diagnosis    Parkinson's disease ([facility] [facility]    Spondylosis of lumbar region without myelopathy or radiculopathy    Acute cystitis    Parkinson's disease (tremor, stiffness, slow motion, unstable posture) ([address]    Mild malnutrition ([address]        PLAN  Agree with the plan to send patient home with home hospice care. At this time we will leave medication adjustments to hospice team.  We will sign off. Please call back with any questions. Lacy Zaldivar is a 68 y.o. female being evaluated by a Virtual Visit (video visit) encounter to address concerns as mentioned above.   A caregiver was present in the room along with the patient. Pursuant to the emergency declaration under the [facility], [address] [place] [address] and the [employer] and [employer] Act, this Virtual Visit was conducted with patient's (and/or legal guardian's) consent, to reduce the patient's risk of exposure to COVID-19 and provide necessary medical care. Services were provided through a video synchronous discussion virtually to substitute for in-person visit by provider. Patient is present at [facility] and I am physically present at [place], [place]    --Kerrie Galicia MD on 6/15/2021 at 7:29 PM    An electronic signature was used to authenticate this note. **This report has been created using voice recognition software. It may contain minor errors which are inherent in voice recognition technology. **     Electronically signed by Kerrie Galicia MD on 6/15/2021 at 7:16 PM time spent 25 minutes [facility]

## 2021-06-20 NOTE — DISCUSSION/SUMMARY
[FreeTextEntry1] : 50F, hx of CAD s/p PCI here for a followup visit\par \par CAD s/p PCI: stable from a cardiopulmonary perspective. Agrees to cont on ASA, and will convert Ticagrelor to Clopidogrel for DAPT given easy bruising and ease of QDay dosing. Cont on Atrovastatin 80mg. Cont BBlockade w/ Toprol XL daily. Patient will trial wean off of Ranexa (every other day x2 weeks followed by discontinuation).\par \par HPL: Cont on Atorvastatin and Ezetimibe. Patient states that LDL was ~50 at PCP visit w/in last 2 months.\par \par F.U in 6 months

## 2021-06-21 ENCOUNTER — RX RENEWAL (OUTPATIENT)
Age: 51
End: 2021-06-21

## 2021-10-05 ENCOUNTER — APPOINTMENT (OUTPATIENT)
Dept: HEART AND VASCULAR | Facility: CLINIC | Age: 51
End: 2021-10-05
Payer: COMMERCIAL

## 2021-10-05 VITALS — DIASTOLIC BLOOD PRESSURE: 56 MMHG | SYSTOLIC BLOOD PRESSURE: 101 MMHG

## 2021-10-05 VITALS — DIASTOLIC BLOOD PRESSURE: 58 MMHG | SYSTOLIC BLOOD PRESSURE: 106 MMHG

## 2021-10-05 VITALS
DIASTOLIC BLOOD PRESSURE: 62 MMHG | BODY MASS INDEX: 28.55 KG/M2 | HEIGHT: 65 IN | HEART RATE: 57 BPM | WEIGHT: 171.38 LBS | OXYGEN SATURATION: 97 % | SYSTOLIC BLOOD PRESSURE: 118 MMHG

## 2021-10-05 DIAGNOSIS — I22.2 SUBSEQUENT NON-ST ELEVATION (NSTEMI) MYOCARDIAL INFARCTION: ICD-10-CM

## 2021-10-05 PROCEDURE — 36415 COLL VENOUS BLD VENIPUNCTURE: CPT

## 2021-10-05 PROCEDURE — 93000 ELECTROCARDIOGRAM COMPLETE: CPT

## 2021-10-05 PROCEDURE — 99072 ADDL SUPL MATRL&STAF TM PHE: CPT

## 2021-10-05 PROCEDURE — 99214 OFFICE O/P EST MOD 30 MIN: CPT | Mod: 25

## 2021-10-05 RX ORDER — RANOLAZINE 500 MG/1
500 TABLET, EXTENDED RELEASE ORAL
Qty: 180 | Refills: 3 | Status: DISCONTINUED | COMMUNITY
Start: 2020-07-13 | End: 2021-10-05

## 2021-10-07 NOTE — HISTORY OF PRESENT ILLNESS
[FreeTextEntry1] : 51yo F with PMHx of CAD diagnosed on 5/2020 s/p NSTEMI and PCI to pLAD and mLAD pending fixing RCA , elevated Lp(a), and essential thrombocytosis who presents today for screening visit for Horizon trial. \par \par Patient reports feeling well today and denies any chest pain, SOB, palpitations, orthopnea, PND or LE edema.\par \par Hospital course: patient presented to [facility] ED on 5/26/20 w/ c/o chest pain and was ruled in for NSTEMI after troponin elevated. \par \par FHX CAD (Mother X2 3VCABG at age 46) and PMHx of Hyperlipidemia, Essential Thrombocytosis, CAD s/p NSTEMI 05/27/2020 s/p dx cath on 5/27/2020 showing 2VCAD, s/p cardiac cath on 05/28/2020 with DANYELLE LM, DANYELLE x 2 \par p/m LAD with residual RCA  with initial plan of staged PCI of RCA in 6 weeks; contacted Dr Vj Lewis endorsing worsening chest discomfort with exertion of one block. These symptoms are similar how she felt prior to her MI, but milder. Echo from 05/27/2020 normal EF, no valve disease. In light of pt's risk factors, CCS Class 3 symptoms, and known RCA  pt presented for recommended cardiac catheterization with staged PCI. Pt now s/p cardiac cath on 06/05/2020: DANYELLE to pRCA (70%) and DANYELLE to mRCA (100% ). Patent stents in LM and LAD. dRCA 60%.\par \par Horizon screening visit:\par All eligibility criteria were met and assessed by PI: Yes\par \par Subject has signed consents:\par Main study ICF: Yes\par Pharmacogenetic informed consent: Yes\par Additional research using personal data: Yes\par \par Family history: \par Number of biological sisters: 3\par Number of biological brothers: 0\par Type of event: N/A (HLD, but no events so far)\par Family member degree: \par Age at first event: \par \par Smoking history:\par Has the subject ever smoked? Never \par Number of packs per day:\par Number of cigarettes per day:\par Number of years smoked:\par End date of smoking: \par \par Alcohol history:\par What is the average number of drinks the subject consumes per day? 0; 4 per week \par Last date of known consumption: 10/2/21\par \par \par \par \par

## 2021-10-07 NOTE — REASON FOR VISIT
[FreeTextEntry1] : 51yo F with PMHx of CAD diagnosed on 5/2020 s/p NSTEMI and PCI to pLAD and mLAD pending fixing RCA , elevated Lp(a), and essential thrombocytosis who presents today for screening visit for Horizon trial.

## 2021-10-07 NOTE — DISCUSSION/SUMMARY
[FreeTextEntry1] : 49yo F with PMHx of CAD diagnosed on 5/2020 s/p NSTEMI and PCI to pLAD and mLAD pending fixing RCA , elevated Lp(a), and essential thrombocytosis who presents today for screening visit for Horizon trial. \par \par All aspects of screening visit completed today, including review of PMHx, FMHx, medications and complete physical exam, labs and EKG. \par \par Risks/benefits of trial explained, and consents completed. If patient qualifies, will f/u for randomization visit. \par

## 2021-12-13 ENCOUNTER — APPOINTMENT (OUTPATIENT)
Dept: HEART AND VASCULAR | Facility: CLINIC | Age: 51
End: 2021-12-13

## 2021-12-13 NOTE — CARDIOLOGY SUMMARY
[de-identified] : \par EKG (10/05/2021):\par - Sinus jh, no ischemic changes [de-identified] : \par TTE (05/27/2020):\par - Normal bi-v size/fxn (LVEF 63%)\par - No sig valve disease [de-identified] : \par LHC (05/27/2020):\par - LM: 30% oLM\par - LAD: 99% pLAD\par - LCx: LI\par - RCA: Dominant. 100 mRCA \par \par LHC (05/28/2020):\par - 50-60% oLM -> 4 x 8mm Promus DANYELLE\par - 90% pLAD -> 3 x 16mm Promus DANYELLE\par - 80% mLAD -> 2.75 x 38mm Promus DANYELLE\par \par LHC (06/05/2020):\par - 70% pRCA -> 3.5 x 38mm Promus Elite DANYELLE\par - 100% mRCA -> 3.0 x 38mm Promus Elite DANYELLE

## 2021-12-13 NOTE — REASON FOR VISIT
[Hyperlipidemia] : hyperlipidemia [Coronary Artery Disease] : coronary artery disease [FreeTextEntry1] : 51F w/PMHx CAD c/b NSTEMI s/p PCI oLM and p/mLAD (5/2020) followed by staged PCI p/m RCA (6/2020) and HLD w/elevated Lp(a)  who presents for f/u.

## 2021-12-13 NOTE — DISCUSSION/SUMMARY
[FreeTextEntry1] : 51F w/PMHx CAD c/b NSTEMI s/p PCI oLM and p/mLAD (5/2020) followed by staged PCI p/m RCA (6/2020) and HLD w/elevated Lp(a)  who presents for f/u.

## 2021-12-13 NOTE — HISTORY OF PRESENT ILLNESS
[FreeTextEntry1] : PATIENT CANCELLED APPT 12/13/2021\par \par Saw preventative team 10/5/21 as part of possible randomization for Horizon trial (pelacarsen for elevated Lp(a)).\par \par Recent Labs (3/16/21):\par -LDL: 58, HDL: 71\par - Lp(a): 111\par - A1c: 5.3\par - Cr: 0.81, K: 5.3

## 2022-02-11 RX ORDER — EZETIMIBE 10 MG/1
10 TABLET ORAL DAILY
Qty: 90 | Refills: 3 | Status: ACTIVE | COMMUNITY
Start: 2020-12-09 | End: 1900-01-01

## 2022-02-28 ENCOUNTER — TRANSCRIPTION ENCOUNTER (OUTPATIENT)
Age: 52
End: 2022-02-28

## 2022-06-27 ENCOUNTER — NON-APPOINTMENT (OUTPATIENT)
Age: 52
End: 2022-06-27

## 2022-08-15 ENCOUNTER — NON-APPOINTMENT (OUTPATIENT)
Age: 52
End: 2022-08-15

## 2022-08-15 ENCOUNTER — APPOINTMENT (OUTPATIENT)
Dept: HEART AND VASCULAR | Facility: CLINIC | Age: 52
End: 2022-08-15

## 2022-08-15 VITALS
SYSTOLIC BLOOD PRESSURE: 135 MMHG | WEIGHT: 171 LBS | TEMPERATURE: 98 F | HEIGHT: 65 IN | DIASTOLIC BLOOD PRESSURE: 79 MMHG | BODY MASS INDEX: 28.49 KG/M2 | HEART RATE: 70 BPM

## 2022-08-15 DIAGNOSIS — I25.10 ATHEROSCLEROTIC HEART DISEASE OF NATIVE CORONARY ARTERY W/OUT ANGINA PECTORIS: ICD-10-CM

## 2022-08-15 DIAGNOSIS — E78.01 FAMILIAL HYPERCHOLESTEROLEMIA: ICD-10-CM

## 2022-08-15 DIAGNOSIS — D47.3 ESSENTIAL (HEMORRHAGIC) THROMBOCYTHEMIA: ICD-10-CM

## 2022-08-15 PROCEDURE — 93000 ELECTROCARDIOGRAM COMPLETE: CPT

## 2022-08-15 PROCEDURE — 99215 OFFICE O/P EST HI 40 MIN: CPT | Mod: 25

## 2022-08-15 RX ORDER — ATORVASTATIN CALCIUM 80 MG/1
80 TABLET, FILM COATED ORAL
Qty: 1 | Refills: 3 | Status: ACTIVE | COMMUNITY
Start: 2020-07-27 | End: 1900-01-01

## 2022-08-15 RX ORDER — METRONIDAZOLE 7.5 MG/G
0.75 CREAM TOPICAL
Qty: 45 | Refills: 0 | Status: ACTIVE | COMMUNITY
Start: 2022-07-26

## 2022-08-15 RX ORDER — CLOPIDOGREL BISULFATE 75 MG/1
75 TABLET, FILM COATED ORAL DAILY
Qty: 90 | Refills: 3 | Status: ACTIVE | COMMUNITY
Start: 2021-06-14 | End: 1900-01-01

## 2022-08-16 PROBLEM — I25.10 CAD (CORONARY ARTERY DISEASE): Status: ACTIVE | Noted: 2020-06-04

## 2022-08-16 PROBLEM — E78.01 FAMILIAL HYPERCHOLESTEROLEMIA: Status: ACTIVE | Noted: 2020-10-01

## 2022-08-16 PROBLEM — D47.3 ESSENTIAL THROMBOCYTOSIS: Status: ACTIVE | Noted: 2020-06-04

## 2022-08-16 NOTE — HISTORY OF PRESENT ILLNESS
[FreeTextEntry1] : 51F w/PMHx CAD c/b NSTEMI s/p PCI oLM and p/mLAD (5/2020) followed by staged PCI p/m RCA (6/2020) and HLD w/elevated Lp(a) who presents for f/u. \par \par She has had a tough year; her mother passed in Dec 2021 after a fall outside her home in [place]. She has been feeling a lot of guilt around not visiting much before she passed. She feels like it was the start of her own health declining. She felt she has gained weight and has not been as active. \par \par Her brother has a form of alzheimer's and she felt her mother's memory was fading before her death. \par \par 7/12/22:   HDL 70 LDL 78 A1c 5.6% Lp(a) 141 mg/dL\par \par She reports feeling well physically. Patient denies any chest pain, SOB, palpitations, orthopnea, PND or LE edema.\par \par She has been compliant w/ all her meds. Refills sent today. Will stop ASA and c/w Plavix only. \par \par They will be moving to [place] in the [facility] area in October of this year. Referrals given for colleagues of Dr. Guallpa. \par

## 2022-08-16 NOTE — CARDIOLOGY SUMMARY
[de-identified] : \par EKG (10/05/2021):\par - Sinus jh, no ischemic changes [de-identified] : \par TTE (05/27/2020):\par - Normal bi-v size/fxn (LVEF 63%)\par - No sig valve disease [de-identified] : \par LHC (05/27/2020):\par - LM: 30% oLM\par - LAD: 99% pLAD\par - LCx: LI\par - RCA: Dominant. 100 mRCA \par \par LHC (05/28/2020):\par - 50-60% oLM -> 4 x 8mm Promus DANYELLE\par - 90% pLAD -> 3 x 16mm Promus DANYELLE\par - 80% mLAD -> 2.75 x 38mm Promus DANYELLE\par \par LHC (06/05/2020):\par - 70% pRCA -> 3.5 x 38mm Promus Elite DANYELLE\par - 100% mRCA -> 3.0 x 38mm Promus Elite DANYELLE

## 2022-08-16 NOTE — DISCUSSION/SUMMARY
[FreeTextEntry1] : 51F w/PMHx CAD c/b NSTEMI s/p PCI oLM and p/mLAD (5/2020) followed by staged PCI p/m RCA (6/2020) and HLD w/elevated Lp(a) who presents for f/u. \par \par CAD c/b NSTEMI s/p PCI oLM and p/mLAD (5/2020): LDL 78 from lab last month \par - since it has been over a year since her PCI, can stop ASA and c/w Plavix only at this time\par - c/w Lipitor 80mg and Zetia 10mg daily \par - she is stable to schedule colonoscopy; will have her stop Plavix 5 days before and switch back to ASA 81mg throughout the procedure; resume Plavix only afterwards\par - will order repeat echo before she moves to [place] \par \par Lifestyle- She acknowledges the challenges that have caused her to have suboptimal behavior recently and plans to address gradually in the next few months. \par \par She plans to move to [place], but will try to do whatever testing is needed locally before she moves. \par

## 2022-08-16 NOTE — END OF VISIT
[FreeTextEntry3] : Patient seen and examined. Case discussed with nurse practitioner. Agree with plan as detailed above.  [Time Spent: ___ minutes] : I have spent [unfilled] minutes of time on the encounter.

## 2022-08-22 ENCOUNTER — APPOINTMENT (OUTPATIENT)
Dept: HEART AND VASCULAR | Facility: CLINIC | Age: 52
End: 2022-08-22

## 2022-08-22 VITALS
BODY MASS INDEX: 28.49 KG/M2 | SYSTOLIC BLOOD PRESSURE: 136 MMHG | WEIGHT: 171 LBS | HEIGHT: 65 IN | HEART RATE: 57 BPM | DIASTOLIC BLOOD PRESSURE: 74 MMHG

## 2022-08-22 PROCEDURE — 93306 TTE W/DOPPLER COMPLETE: CPT

## 2022-09-19 NOTE — DISCUSSION/SUMMARY
[FreeTextEntry1] : Patient is a 50yo F with PMHx of CAD diagnosed on 5/2020 s/p NSTEMI and PCI to pLAD and mLAD pending fixing RCA , elevated Lp(a), and essential thrombocytosis who is following up as part of our risk reduction program and [facility] Study. \par \par Patient has been tolerating her medications well and is compliant. She was started on atorvastatin 80mg during her hospital stay (she had never taken any lipid-lowering medications previously) and we plan to have her lipids retested in approximately 2 months with her cardiologist. \par \par She educated herself early on in her life about overall healthy habits as well as cardiovascular health, due to her mother's experience with premature CAD. We spoke about her risk factors and how to optimize them with a combination of her prescribed medications and healthy lifestyle habits. She is gradually increasing her physical activity to her previous levels and she had already been following a Mediterranean dietary pattern. We discussed some substitutions and strategies to further optimize her diet, as well as for her  so they can work together to further improve their nutrition. \par \par I explained the overall process we plan to follow in regard to the Young Heart study, and that we would soon be sending resources such as exercise videos, teaching kitchen videos with recipe tutorials as well as follow ups with me to assess her progress and adjust our plan as needed. Xray Wrist 3 Views, Right

## 2022-09-27 NOTE — ED ADULT NURSE NOTE - CAS DISCH BELONGINGS RETURNED
Spoke with patient. Rescheduled appointment with Dr. Leung from 10/27 at 9:40 to 11/4 at 9:40.   Not applicable

## 2022-12-01 ENCOUNTER — RX RENEWAL (OUTPATIENT)
Age: 52
End: 2022-12-01

## 2022-12-01 RX ORDER — METOPROLOL SUCCINATE 25 MG/1
25 TABLET, EXTENDED RELEASE ORAL
Qty: 90 | Refills: 3 | Status: ACTIVE | COMMUNITY
Start: 2020-12-07 | End: 1900-01-01

## 2023-03-07 NOTE — H&P ADULT - TEMPERATURE IN CELSIUS (DEGREES C)
No outpatient medications have been marked as taking for the 3/14/23 encounter [facility] HOSPITAL Encounter)  Medication instructions for day surgery reviewed  Please use only a sip of water to take your instructed medications  Avoid all over the counter vitamins, supplements and NSAIDS for one week prior to surgery per anesthesia guidelines  Tylenol is ok to take as needed  You will receive a call one business day prior to surgery with an arrival time and hospital directions  If your surgery is scheduled on a Monday, the hospital will be calling you on the Friday prior to your surgery  If you have not heard from anyone by 8pm, please call the hospital supervisor through the hospital  at [phone]  [facility] [phone])  Do not eat or drink anything after midnight the night before your surgery, including candy, mints, lifesavers, or chewing gum  Do not drink alcohol 24hrs before your surgery  Try not to smoke at least 24hrs before your surgery  Follow the pre surgery showering instructions as listed in the [facility] Surgical Experience Booklet” or otherwise provided by your surgeon's office  Do not shave the surgical area 24 hours before surgery  Do not apply any lotions, creams, including makeup, cologne, deodorant, or perfumes after showering on the day of your surgery  No contact lenses, eye make-up, or artificial eyelashes  Remove nail polish, including gel polish, and any artificial, gel, or acrylic nails if possible  Remove all jewelry including rings and body piercing jewelry  Wear causal clothing that is easy to take on and off  Consider your type of surgery  Keep any valuables, jewelry, piercings at home  Please bring any specially ordered equipment (sling, braces) if indicated  Arrange for a responsible person to drive you to and from the hospital on the day of your surgery  Visitor Guidelines discussed       Call the surgeon's office with any new illnesses, exposures, or additional questions prior to surgery  Please reference your [facility] Surgical Experience Booklet” for additional information to prepare for your upcoming surgery  Advised to refrain from smoking or drinking alcohol 24 hours prior to surgery  Patient verbalized understanding  37.2

## 2023-05-02 NOTE — DISCHARGE NOTE PROVIDER - NSDCCPGOAL_GEN_ALL_CORE_FT
To get better and follow your care plan as instructed.
Eat healthy foods you enjoy. Rivaroxaban/Xarelto DOES NOT have a special diet. Limit your alcohol intake.